# Patient Record
Sex: MALE | Race: WHITE | NOT HISPANIC OR LATINO | Employment: FULL TIME | URBAN - METROPOLITAN AREA
[De-identification: names, ages, dates, MRNs, and addresses within clinical notes are randomized per-mention and may not be internally consistent; named-entity substitution may affect disease eponyms.]

---

## 2018-02-21 ENCOUNTER — OFFICE VISIT CONVERTED (OUTPATIENT)
Dept: CARDIOLOGY | Facility: CLINIC | Age: 73
End: 2018-02-21
Attending: INTERNAL MEDICINE

## 2018-04-09 ENCOUNTER — OFFICE VISIT CONVERTED (OUTPATIENT)
Dept: FAMILY MEDICINE CLINIC | Facility: CLINIC | Age: 73
End: 2018-04-09
Attending: FAMILY MEDICINE

## 2018-08-09 ENCOUNTER — OFFICE VISIT CONVERTED (OUTPATIENT)
Dept: FAMILY MEDICINE CLINIC | Facility: CLINIC | Age: 73
End: 2018-08-09
Attending: NURSE PRACTITIONER

## 2018-08-29 ENCOUNTER — OFFICE VISIT CONVERTED (OUTPATIENT)
Dept: CARDIOLOGY | Facility: CLINIC | Age: 73
End: 2018-08-29
Attending: INTERNAL MEDICINE

## 2018-09-07 ENCOUNTER — OFFICE VISIT CONVERTED (OUTPATIENT)
Dept: FAMILY MEDICINE CLINIC | Facility: CLINIC | Age: 73
End: 2018-09-07
Attending: FAMILY MEDICINE

## 2019-02-08 ENCOUNTER — OFFICE VISIT CONVERTED (OUTPATIENT)
Dept: FAMILY MEDICINE CLINIC | Facility: CLINIC | Age: 74
End: 2019-02-08
Attending: FAMILY MEDICINE

## 2019-02-08 ENCOUNTER — CONVERSION ENCOUNTER (OUTPATIENT)
Dept: FAMILY MEDICINE CLINIC | Facility: CLINIC | Age: 74
End: 2019-02-08

## 2019-02-27 ENCOUNTER — HOSPITAL ENCOUNTER (OUTPATIENT)
Dept: LAB | Facility: HOSPITAL | Age: 74
Discharge: HOME | End: 2019-02-27
Attending: INTERNAL MEDICINE

## 2019-02-27 LAB
ALBUMIN SERPL-MCNC: 4 G/DL (ref 3.5–5)
ALBUMIN/GLOB SERPL: 1.3 {RATIO} (ref 1.4–2.6)
ALP SERPL-CCNC: 100 U/L (ref 56–155)
ALT SERPL-CCNC: 28 U/L (ref 10–40)
ANION GAP SERPL CALC-SCNC: 19 MMOL/L (ref 8–19)
AST SERPL-CCNC: 34 U/L (ref 15–50)
BASOPHILS # BLD AUTO: 0.02 10*3/UL (ref 0–0.2)
BASOPHILS NFR BLD AUTO: 0.3 % (ref 0–3)
BILIRUB SERPL-MCNC: 0.4 MG/DL (ref 0.2–1.3)
BUN SERPL-MCNC: 21 MG/DL (ref 5–25)
BUN/CREAT SERPL: 18 {RATIO} (ref 6–20)
CALCIUM SERPL-MCNC: 9.5 MG/DL (ref 8.7–10.4)
CHLORIDE SERPL-SCNC: 108 MMOL/L (ref 99–111)
CHOLEST SERPL-MCNC: 130 MG/DL (ref 107–200)
CHOLEST/HDLC SERPL: 2.4 {RATIO} (ref 3–6)
CONV ABS IMM GRAN: 0.01 10*3/UL (ref 0–0.2)
CONV CO2: 22 MMOL/L (ref 22–32)
CONV IMMATURE GRAN: 0.2 % (ref 0–1.8)
CONV TOTAL PROTEIN: 7 G/DL (ref 6.3–8.2)
CREAT UR-MCNC: 1.2 MG/DL (ref 0.7–1.2)
DEPRECATED RDW RBC AUTO: 47.3 FL (ref 35.1–43.9)
EOSINOPHIL # BLD AUTO: 0.17 10*3/UL (ref 0–0.7)
EOSINOPHIL # BLD AUTO: 2.7 % (ref 0–7)
ERYTHROCYTE [DISTWIDTH] IN BLOOD BY AUTOMATED COUNT: 13.6 % (ref 11.6–14.4)
GFR SERPLBLD BASED ON 1.73 SQ M-ARVRAT: >60 ML/MIN/{1.73_M2}
GLOBULIN UR ELPH-MCNC: 3 G/DL (ref 2–3.5)
GLUCOSE SERPL-MCNC: 99 MG/DL (ref 70–99)
HBA1C MFR BLD: 12.9 G/DL (ref 14–18)
HCT VFR BLD AUTO: 39.5 % (ref 42–52)
HDLC SERPL-MCNC: 54 MG/DL (ref 40–60)
LDLC SERPL CALC-MCNC: 62 MG/DL (ref 70–100)
LYMPHOCYTES # BLD AUTO: 1.63 10*3/UL (ref 1–5)
MCH RBC QN AUTO: 31 PG (ref 27–31)
MCHC RBC AUTO-ENTMCNC: 32.7 G/DL (ref 33–37)
MCV RBC AUTO: 95 FL (ref 80–96)
MONOCYTES # BLD AUTO: 0.62 10*3/UL (ref 0.2–1.2)
MONOCYTES NFR BLD AUTO: 10 % (ref 3–10)
NEUTROPHILS # BLD AUTO: 3.75 10*3/UL (ref 2–8)
NEUTROPHILS NFR BLD AUTO: 60.5 % (ref 30–85)
NRBC CBCN: 0 % (ref 0–0.7)
OSMOLALITY SERPL CALC.SUM OF ELEC: 303 MOSM/KG (ref 273–304)
PLATELET # BLD AUTO: 186 10*3/UL (ref 130–400)
PMV BLD AUTO: 10.9 FL (ref 9.4–12.4)
POTASSIUM SERPL-SCNC: 4.4 MMOL/L (ref 3.5–5.3)
RBC # BLD AUTO: 4.16 10*6/UL (ref 4.7–6.1)
SODIUM SERPL-SCNC: 145 MMOL/L (ref 135–147)
T4 FREE SERPL-MCNC: 1.5 NG/DL (ref 0.9–1.8)
TRIGL SERPL-MCNC: 72 MG/DL (ref 40–150)
TSH SERPL-ACNC: 1.37 M[IU]/L (ref 0.27–4.2)
VARIANT LYMPHS NFR BLD MANUAL: 26.3 % (ref 20–45)
VLDLC SERPL-MCNC: 14 MG/DL (ref 5–37)
WBC # BLD AUTO: 6.2 10*3/UL (ref 4.8–10.8)

## 2019-03-04 ENCOUNTER — OFFICE VISIT CONVERTED (OUTPATIENT)
Dept: CARDIOLOGY | Facility: CLINIC | Age: 74
End: 2019-03-04
Attending: INTERNAL MEDICINE

## 2019-08-13 ENCOUNTER — CONVERSION ENCOUNTER (OUTPATIENT)
Dept: FAMILY MEDICINE CLINIC | Facility: CLINIC | Age: 74
End: 2019-08-13

## 2019-08-13 ENCOUNTER — OFFICE VISIT CONVERTED (OUTPATIENT)
Dept: FAMILY MEDICINE CLINIC | Facility: CLINIC | Age: 74
End: 2019-08-13
Attending: FAMILY MEDICINE

## 2019-08-29 ENCOUNTER — HOSPITAL ENCOUNTER (OUTPATIENT)
Dept: LAB | Facility: HOSPITAL | Age: 74
Discharge: HOME | End: 2019-08-29
Attending: INTERNAL MEDICINE

## 2019-08-29 LAB
ALBUMIN SERPL-MCNC: 4.4 G/DL (ref 3.5–5)
ALBUMIN/GLOB SERPL: 1.6 {RATIO} (ref 1.4–2.6)
ALP SERPL-CCNC: 87 U/L (ref 56–155)
ALT SERPL-CCNC: 23 U/L (ref 10–40)
ANION GAP SERPL CALC-SCNC: 18 MMOL/L (ref 8–19)
AST SERPL-CCNC: 34 U/L (ref 15–50)
BASOPHILS # BLD AUTO: 0.01 10*3/UL (ref 0–0.2)
BASOPHILS NFR BLD AUTO: 0.2 % (ref 0–3)
BILIRUB SERPL-MCNC: 0.58 MG/DL (ref 0.2–1.3)
BUN SERPL-MCNC: 21 MG/DL (ref 5–25)
BUN/CREAT SERPL: 21 {RATIO} (ref 6–20)
CALCIUM SERPL-MCNC: 9.2 MG/DL (ref 8.7–10.4)
CHLORIDE SERPL-SCNC: 104 MMOL/L (ref 99–111)
CHOLEST SERPL-MCNC: 122 MG/DL (ref 107–200)
CHOLEST/HDLC SERPL: 2.5 {RATIO} (ref 3–6)
CONV ABS IMM GRAN: 0.01 10*3/UL (ref 0–0.2)
CONV CO2: 25 MMOL/L (ref 22–32)
CONV IMMATURE GRAN: 0.2 % (ref 0–1.8)
CONV TOTAL PROTEIN: 7.1 G/DL (ref 6.3–8.2)
CREAT UR-MCNC: 1.01 MG/DL (ref 0.7–1.2)
DEPRECATED RDW RBC AUTO: 48.2 FL (ref 35.1–43.9)
EOSINOPHIL # BLD AUTO: 0.1 10*3/UL (ref 0–0.7)
EOSINOPHIL # BLD AUTO: 2.1 % (ref 0–7)
ERYTHROCYTE [DISTWIDTH] IN BLOOD BY AUTOMATED COUNT: 13.8 % (ref 11.6–14.4)
GFR SERPLBLD BASED ON 1.73 SQ M-ARVRAT: >60 ML/MIN/{1.73_M2}
GLOBULIN UR ELPH-MCNC: 2.7 G/DL (ref 2–3.5)
GLUCOSE SERPL-MCNC: 87 MG/DL (ref 70–99)
HCT VFR BLD AUTO: 39.7 % (ref 42–52)
HDLC SERPL-MCNC: 49 MG/DL (ref 40–60)
HGB BLD-MCNC: 12.9 G/DL (ref 14–18)
LDLC SERPL CALC-MCNC: 60 MG/DL (ref 70–100)
LYMPHOCYTES # BLD AUTO: 1.52 10*3/UL (ref 1–5)
LYMPHOCYTES NFR BLD AUTO: 31.4 % (ref 20–45)
MCH RBC QN AUTO: 31 PG (ref 27–31)
MCHC RBC AUTO-ENTMCNC: 32.5 G/DL (ref 33–37)
MCV RBC AUTO: 95.4 FL (ref 80–96)
MONOCYTES # BLD AUTO: 0.44 10*3/UL (ref 0.2–1.2)
MONOCYTES NFR BLD AUTO: 9.1 % (ref 3–10)
NEUTROPHILS # BLD AUTO: 2.76 10*3/UL (ref 2–8)
NEUTROPHILS NFR BLD AUTO: 57 % (ref 30–85)
NRBC CBCN: 0 % (ref 0–0.7)
OSMOLALITY SERPL CALC.SUM OF ELEC: 298 MOSM/KG (ref 273–304)
PLATELET # BLD AUTO: 166 10*3/UL (ref 130–400)
PMV BLD AUTO: 11.2 FL (ref 9.4–12.4)
POTASSIUM SERPL-SCNC: 4.1 MMOL/L (ref 3.5–5.3)
RBC # BLD AUTO: 4.16 10*6/UL (ref 4.7–6.1)
SODIUM SERPL-SCNC: 143 MMOL/L (ref 135–147)
T4 FREE SERPL-MCNC: 1.5 NG/DL (ref 0.9–1.8)
TRIGL SERPL-MCNC: 63 MG/DL (ref 40–150)
TSH SERPL-ACNC: 1.32 M[IU]/L (ref 0.27–4.2)
VLDLC SERPL-MCNC: 13 MG/DL (ref 5–37)
WBC # BLD AUTO: 4.84 10*3/UL (ref 4.8–10.8)

## 2019-09-04 ENCOUNTER — OFFICE VISIT CONVERTED (OUTPATIENT)
Dept: CARDIOLOGY | Facility: CLINIC | Age: 74
End: 2019-09-04
Attending: INTERNAL MEDICINE

## 2019-11-07 ENCOUNTER — HOSPITAL ENCOUNTER (OUTPATIENT)
Dept: URGENT CARE | Facility: CLINIC | Age: 74
Discharge: HOME | End: 2019-11-07
Attending: EMERGENCY MEDICINE

## 2020-03-09 ENCOUNTER — HOSPITAL ENCOUNTER (OUTPATIENT)
Dept: LAB | Facility: HOSPITAL | Age: 75
Discharge: HOME | End: 2020-03-09
Attending: NURSE PRACTITIONER

## 2020-03-09 LAB
ALBUMIN SERPL-MCNC: 4.1 G/DL (ref 3.5–5)
ALBUMIN/GLOB SERPL: 1.4 {RATIO} (ref 1.4–2.6)
ALP SERPL-CCNC: 84 U/L (ref 56–155)
ALT SERPL-CCNC: 23 U/L (ref 10–40)
ANION GAP SERPL CALC-SCNC: 16 MMOL/L (ref 8–19)
AST SERPL-CCNC: 34 U/L (ref 15–50)
BASOPHILS # BLD AUTO: 0.03 10*3/UL (ref 0–0.2)
BASOPHILS NFR BLD AUTO: 0.4 % (ref 0–3)
BILIRUB SERPL-MCNC: 0.43 MG/DL (ref 0.2–1.3)
BUN SERPL-MCNC: 22 MG/DL (ref 5–25)
BUN/CREAT SERPL: 20 {RATIO} (ref 6–20)
CALCIUM SERPL-MCNC: 9.4 MG/DL (ref 8.7–10.4)
CHLORIDE SERPL-SCNC: 103 MMOL/L (ref 99–111)
CHOLEST SERPL-MCNC: 136 MG/DL (ref 107–200)
CHOLEST/HDLC SERPL: 2.5 {RATIO} (ref 3–6)
CONV ABS IMM GRAN: 0.02 10*3/UL (ref 0–0.2)
CONV CO2: 24 MMOL/L (ref 22–32)
CONV IMMATURE GRAN: 0.3 % (ref 0–1.8)
CONV TOTAL PROTEIN: 7 G/DL (ref 6.3–8.2)
CREAT UR-MCNC: 1.09 MG/DL (ref 0.7–1.2)
DEPRECATED RDW RBC AUTO: 46.9 FL (ref 35.1–43.9)
EOSINOPHIL # BLD AUTO: 0.18 10*3/UL (ref 0–0.7)
EOSINOPHIL # BLD AUTO: 2.5 % (ref 0–7)
ERYTHROCYTE [DISTWIDTH] IN BLOOD BY AUTOMATED COUNT: 13.4 % (ref 11.6–14.4)
GFR SERPLBLD BASED ON 1.73 SQ M-ARVRAT: >60 ML/MIN/{1.73_M2}
GLOBULIN UR ELPH-MCNC: 2.9 G/DL (ref 2–3.5)
GLUCOSE SERPL-MCNC: 93 MG/DL (ref 70–99)
HCT VFR BLD AUTO: 40.6 % (ref 42–52)
HDLC SERPL-MCNC: 54 MG/DL (ref 40–60)
HGB BLD-MCNC: 13.3 G/DL (ref 14–18)
LDLC SERPL CALC-MCNC: 59 MG/DL (ref 70–100)
LYMPHOCYTES # BLD AUTO: 1.87 10*3/UL (ref 1–5)
LYMPHOCYTES NFR BLD AUTO: 25.5 % (ref 20–45)
MCH RBC QN AUTO: 31.4 PG (ref 27–31)
MCHC RBC AUTO-ENTMCNC: 32.8 G/DL (ref 33–37)
MCV RBC AUTO: 95.8 FL (ref 80–96)
MONOCYTES # BLD AUTO: 0.66 10*3/UL (ref 0.2–1.2)
MONOCYTES NFR BLD AUTO: 9 % (ref 3–10)
NEUTROPHILS # BLD AUTO: 4.57 10*3/UL (ref 2–8)
NEUTROPHILS NFR BLD AUTO: 62.3 % (ref 30–85)
NRBC CBCN: 0 % (ref 0–0.7)
OSMOLALITY SERPL CALC.SUM OF ELEC: 291 MOSM/KG (ref 273–304)
PLATELET # BLD AUTO: 158 10*3/UL (ref 130–400)
PMV BLD AUTO: 11.1 FL (ref 9.4–12.4)
POTASSIUM SERPL-SCNC: 4.2 MMOL/L (ref 3.5–5.3)
RBC # BLD AUTO: 4.24 10*6/UL (ref 4.7–6.1)
SODIUM SERPL-SCNC: 139 MMOL/L (ref 135–147)
T4 FREE SERPL-MCNC: 1.3 NG/DL (ref 0.9–1.8)
TRIGL SERPL-MCNC: 113 MG/DL (ref 40–150)
TSH SERPL-ACNC: 2.68 M[IU]/L (ref 0.27–4.2)
VLDLC SERPL-MCNC: 23 MG/DL (ref 5–37)
WBC # BLD AUTO: 7.33 10*3/UL (ref 4.8–10.8)

## 2020-04-02 ENCOUNTER — OFFICE VISIT CONVERTED (OUTPATIENT)
Dept: UROLOGY | Facility: CLINIC | Age: 75
End: 2020-04-02
Attending: UROLOGY

## 2020-04-02 ENCOUNTER — CONVERSION ENCOUNTER (OUTPATIENT)
Dept: SURGERY | Facility: CLINIC | Age: 75
End: 2020-04-02

## 2020-04-28 ENCOUNTER — HOSPITAL ENCOUNTER (OUTPATIENT)
Dept: PERIOP | Facility: HOSPITAL | Age: 75
Setting detail: HOSPITAL OUTPATIENT SURGERY
Discharge: HOME | End: 2020-04-28
Attending: UROLOGY

## 2020-05-07 ENCOUNTER — CONVERSION ENCOUNTER (OUTPATIENT)
Dept: SURGERY | Facility: CLINIC | Age: 75
End: 2020-05-07

## 2020-05-07 ENCOUNTER — TELEMEDICINE CONVERTED (OUTPATIENT)
Dept: UROLOGY | Facility: CLINIC | Age: 75
End: 2020-05-07
Attending: UROLOGY

## 2020-05-21 ENCOUNTER — HOSPITAL ENCOUNTER (OUTPATIENT)
Dept: OTHER | Facility: HOSPITAL | Age: 75
Discharge: HOME | End: 2020-05-21
Attending: INTERNAL MEDICINE

## 2020-05-21 ENCOUNTER — TELEMEDICINE CONVERTED (OUTPATIENT)
Dept: CARDIOLOGY | Facility: CLINIC | Age: 75
End: 2020-05-21
Attending: INTERNAL MEDICINE

## 2020-05-21 LAB
ALBUMIN SERPL-MCNC: 4.2 G/DL (ref 3.5–5)
ALBUMIN/GLOB SERPL: 1.8 {RATIO} (ref 1.4–2.6)
ALP SERPL-CCNC: 68 U/L (ref 56–155)
ALT SERPL-CCNC: 19 U/L (ref 10–40)
ANION GAP SERPL CALC-SCNC: 11 MMOL/L (ref 8–19)
AST SERPL-CCNC: 30 U/L (ref 15–50)
BASOPHILS # BLD AUTO: 0.02 10*3/UL (ref 0–0.2)
BASOPHILS NFR BLD AUTO: 0.3 % (ref 0–3)
BILIRUB SERPL-MCNC: 0.27 MG/DL (ref 0.2–1.3)
BUN SERPL-MCNC: 10 MG/DL (ref 5–25)
BUN/CREAT SERPL: 11 {RATIO} (ref 6–20)
CALCIUM SERPL-MCNC: 9 MG/DL (ref 8.7–10.4)
CHLORIDE SERPL-SCNC: 107 MMOL/L (ref 99–111)
CONV ABS IMM GRAN: 0.02 10*3/UL (ref 0–0.2)
CONV CO2: 26 MMOL/L (ref 22–32)
CONV IMMATURE GRAN: 0.3 % (ref 0–1.8)
CONV TOTAL PROTEIN: 6.5 G/DL (ref 6.3–8.2)
CREAT UR-MCNC: 0.89 MG/DL (ref 0.7–1.2)
DEPRECATED RDW RBC AUTO: 47.8 FL (ref 35.1–43.9)
EOSINOPHIL # BLD AUTO: 0.22 10*3/UL (ref 0–0.7)
EOSINOPHIL # BLD AUTO: 3.8 % (ref 0–7)
ERYTHROCYTE [DISTWIDTH] IN BLOOD BY AUTOMATED COUNT: 13.5 % (ref 11.6–14.4)
GFR SERPLBLD BASED ON 1.73 SQ M-ARVRAT: >60 ML/MIN/{1.73_M2}
GLOBULIN UR ELPH-MCNC: 2.3 G/DL (ref 2–3.5)
GLUCOSE SERPL-MCNC: 102 MG/DL (ref 70–99)
HCT VFR BLD AUTO: 35.4 % (ref 42–52)
HGB BLD-MCNC: 11.3 G/DL (ref 14–18)
LYMPHOCYTES # BLD AUTO: 1.7 10*3/UL (ref 1–5)
LYMPHOCYTES NFR BLD AUTO: 29.2 % (ref 20–45)
MCH RBC QN AUTO: 30.5 PG (ref 27–31)
MCHC RBC AUTO-ENTMCNC: 31.9 G/DL (ref 33–37)
MCV RBC AUTO: 95.4 FL (ref 80–96)
MONOCYTES # BLD AUTO: 0.52 10*3/UL (ref 0.2–1.2)
MONOCYTES NFR BLD AUTO: 8.9 % (ref 3–10)
NEUTROPHILS # BLD AUTO: 3.35 10*3/UL (ref 2–8)
NEUTROPHILS NFR BLD AUTO: 57.5 % (ref 30–85)
NRBC CBCN: 0 % (ref 0–0.7)
OSMOLALITY SERPL CALC.SUM OF ELEC: 289 MOSM/KG (ref 273–304)
PLATELET # BLD AUTO: 184 10*3/UL (ref 130–400)
PMV BLD AUTO: 10.3 FL (ref 9.4–12.4)
POTASSIUM SERPL-SCNC: 4.1 MMOL/L (ref 3.5–5.3)
RBC # BLD AUTO: 3.71 10*6/UL (ref 4.7–6.1)
SODIUM SERPL-SCNC: 140 MMOL/L (ref 135–147)
WBC # BLD AUTO: 5.83 10*3/UL (ref 4.8–10.8)

## 2020-11-04 ENCOUNTER — CONVERSION ENCOUNTER (OUTPATIENT)
Dept: FAMILY MEDICINE CLINIC | Facility: CLINIC | Age: 75
End: 2020-11-04

## 2020-11-04 ENCOUNTER — OFFICE VISIT CONVERTED (OUTPATIENT)
Dept: FAMILY MEDICINE CLINIC | Facility: CLINIC | Age: 75
End: 2020-11-04
Attending: INTERNAL MEDICINE

## 2020-12-29 ENCOUNTER — HOSPITAL ENCOUNTER (OUTPATIENT)
Dept: LAB | Facility: HOSPITAL | Age: 75
Discharge: HOME | End: 2020-12-29
Attending: INTERNAL MEDICINE

## 2020-12-29 LAB
ALBUMIN SERPL-MCNC: 3.9 G/DL (ref 3.5–5)
ALBUMIN/GLOB SERPL: 1.4 {RATIO} (ref 1.4–2.6)
ALP SERPL-CCNC: 91 U/L (ref 56–155)
ALT SERPL-CCNC: 24 U/L (ref 10–40)
ANION GAP SERPL CALC-SCNC: 14 MMOL/L (ref 8–19)
AST SERPL-CCNC: 33 U/L (ref 15–50)
BILIRUB SERPL-MCNC: 0.29 MG/DL (ref 0.2–1.3)
BUN SERPL-MCNC: 18 MG/DL (ref 5–25)
BUN/CREAT SERPL: 15 {RATIO} (ref 6–20)
CALCIUM SERPL-MCNC: 9.1 MG/DL (ref 8.7–10.4)
CHLORIDE SERPL-SCNC: 105 MMOL/L (ref 99–111)
CHOLEST SERPL-MCNC: 141 MG/DL (ref 107–200)
CHOLEST/HDLC SERPL: 2.4 {RATIO} (ref 3–6)
CONV CO2: 25 MMOL/L (ref 22–32)
CONV TOTAL PROTEIN: 6.7 G/DL (ref 6.3–8.2)
CREAT UR-MCNC: 1.24 MG/DL (ref 0.7–1.2)
GFR SERPLBLD BASED ON 1.73 SQ M-ARVRAT: 57 ML/MIN/{1.73_M2}
GLOBULIN UR ELPH-MCNC: 2.8 G/DL (ref 2–3.5)
GLUCOSE SERPL-MCNC: 98 MG/DL (ref 70–99)
HDLC SERPL-MCNC: 58 MG/DL (ref 40–60)
LDLC SERPL CALC-MCNC: 61 MG/DL (ref 70–100)
OSMOLALITY SERPL CALC.SUM OF ELEC: 292 MOSM/KG (ref 273–304)
POTASSIUM SERPL-SCNC: 4.3 MMOL/L (ref 3.5–5.3)
SODIUM SERPL-SCNC: 140 MMOL/L (ref 135–147)
TRIGL SERPL-MCNC: 108 MG/DL (ref 40–150)
VLDLC SERPL-MCNC: 22 MG/DL (ref 5–37)

## 2021-01-04 ENCOUNTER — OFFICE VISIT CONVERTED (OUTPATIENT)
Dept: CARDIOLOGY | Facility: CLINIC | Age: 76
End: 2021-01-04
Attending: INTERNAL MEDICINE

## 2021-05-10 NOTE — PROCEDURES
Procedure Note      Patient Name: Derek Castelan   Patient ID: [id number]   Sex: Male   Birthdate: Wilma 15, 1945    Primary Care Provider: Huey Hadley MD   Referring Provider: Huey Hadley MD    Visit Date: April 2, 2020    Provider: Laurel Orellana MD   Location: [facility]   Location Address: [address] [place] [address]  [id number]   Location Phone: [phone]          Cystoscopy Procedure:  PROCEDURE: Flexible cystoscope was passed per urethra into the bladder without difficulty after proper consent. The bladder was inspected in a systematic meridian fashion. There was several large papillary tumors at the base of bladder and overlying the trigone; obscuring somewhat the ureteral orifices. Papillary carpeting noted as well. Some edematous and erythematous bladder mucosa at the posterior wall consistent with ventura catheter placement. No stones, or other abnormalities noted within the bladder. The flexible cystoscope was removed. The catheter remained out at the conclusion of the procedure. The patient tolerated the procedure well.           Assessment  · Hematuria     599.70/R31.9  · Bladder mass     596.89/N32.89    Problems Reconciled  Plan  · Orders  o Cystoscopy (19334) - - 04/02/2020  · Medications  o Medications have been Reconciled  o Transition of Care or Provider Policy  · Instructions  o Electronically Identified Patient Education Materials Provided Electronically     Tolerated procedure well  Catheter remained out; wife provided supplies should patient not be able to void later  Findings discussed with patient and wife at length; findings consistent with bladder cancer but aware that dx remains uncertain until pathologic dx obtained  Given the extent of the tumor and unknown pathology, recommend proceeding with Cystoscopy, bilateral retrograde pyelogram, and transurethral resection of bladder tumor in OR   Recommend that this be performed as soon as possible; risks/benefit  discussion performed given Covid-19 pandemic; patient aware that depending or operative findings may need overnight observation and will likely go home with catheter while bladder heals  Risks of procedure discussed include but not limited to bleeding, infection, pain, damage to surrounding structure, and need for further procedures.  Discussed that cardiac clearance will be required and if at all possible, will need to hold or bridge anticoagulation; will obtain from Cards; patient to arrange f/u appt with Dr. Chao as had previously been postponed due to pandemic    Patient and wife participated in the discussion and are agreeable to proceed to OR once cardiac clearance obtained  Will schedule  All questions addressed             Electronically Signed by: Laurel Orellana MD -Author on April 8, 2020 02:18:47 PM

## 2021-05-10 NOTE — H&P
"   History and Physical      Patient Name: Derek Castelan   Patient ID: [id number]   Sex: Male   Birthdate: Wilma 15, 1945    Primary Care Provider: Huey Hadley MD   Referring Provider: Huey Hadley MD    Visit Date: April 2, 2020    Provider: Laurel Orellana MD   Location: [facility]   Location Address: [address] [place] [address]  [id number]   Location Phone: [phone]          Chief Complaint  · Pt is here for urological concerns      History Of Present Illness  The patient is a 74 year old /White male, who is a consultation from Huey Hadley MD, for the evaluation of an episode of gross hematuria. He noticed blood in his urine 2 weeks ago. The hematuria was constant throughout the stream. It was associated with strenuous activity and mowing and heavy lifting.     He states the color of his urine during an episode is bright red and with large clots. The patient has no additional complaints. He denies urgency, dysuria, back pain, fever, chills, nausea, vomiting, and Complains of increased recent nocturia for which he took myrbetriq which helped little..     He states that there is no history of recent abdominal or flank trauma. The patient's past medical history is notable for anticoagulant use and Brillinta by Dr. Chao. Has never come off of it for any procedures. H/o 6 vessel CABG..     The patient has not been previously evaluated for hematuria.      600cc. Patient currently has catheter in, hematuria resolved spontaneously.     CT 3/27/20 with bilateral hydronephrosis and 5 cm hyperdensity within lumen of bladder clot vs tumor  Urine ctx 3/27/20: no growth\">Denies recent weight loss.  No family h/o  malignancy.  No prior tobacco use.    Wife is retired nurse; placed catheter in patient prior to presenting to ER when hematuria started due to difficulty voiding; yielded >600cc. Patient currently has catheter in, hematuria resolved spontaneously.     CT 3/27/20 with bilateral " "hydronephrosis and 5 cm hyperdensity within lumen of bladder clot vs tumor  Urine ctx 3/27/20: no growth       Past Medical History  Calculus Of Kidney; Coronary artery disease; High cholesterol; Hypertension; Hypothyroidism         Past Surgical History  Hernia; Open Heart Surgery; skin cancer removed         Medication List  aspirin 81 mg Oral tablet,delayed release (DR/EC); Brilinta 60 mg oral tablet; carvedilol 3.125 mg oral tablet; Centrum Silver 0.4-300-250 mg-mcg-mcg oral tablet; Crestor 40 mg oral tablet; levothyroxine 88 mcg oral tablet; Myrbetriq 50 mg oral tablet extended release 24 hr; nitroglycerin 0.2 mg/hr transdermal patch 24 hour         Allergy List  NO KNOWN DRUG ALLERGIES         Family Medical History  Heart Disease; Diabetes, unspecified type; Hypertension         Social History  Active but no formal exercise; Alcohol (Never); ; No known infection risk; Tobacco (Never)         Immunizations  Name Date Admin   Influenza    Influenza    Pneumococcal    Tdap          Review of Systems  · Constitutional  o Denies  o : chills, fever  · Cardiovascular  o Denies  o : chest pain on exertion  · Gastrointestinal  o Denies  o : nausea, vomiting, diarrhea  · Genitourinary  o Admits  o : blood in urine  · Neurologic  o Denies  o : tingling or numbness  · Musculoskeletal  o Denies  o : joint pain  · Endocrine  o Denies  o : weight gain, weight loss  · Heme-Lymph  o Admits  o : easy bleeding, easy bruising      Vitals  Date Time BP Position Site L\R Cuff Size HR RR TEMP (F) WT  HT  BMI kg/m2 BSA m2 O2 Sat        04/02/2020 11:11 AM       14  197lbs 0oz 5'  10\" 28.27 2.1           Physical Examination  · Constitutional  o Appearance  o : Well nourished, well developed patient in no acute distress. Ambulating without difficulty.  · Head and Face  o Head  o :   § Inspection  § : Normocephalic, atraumatic  o Face  o :   § Inspection  § : No facial lesions  · Respiratory  o Respiratory Effort  o : " Breathing is unlabored without accessory muscle use  o Inspection of Chest  o : Normal appearance, no retractions  · Cardiovascular  o Heart  o : normal rate   · Skin and Subcutaneous Tissue  o General Inspection  o : No rashes, lesions or areas of discoloration present. Skin turgor is normal.  o General Palpation  o : No abnormalities, masses or tenderness on palpation.  · Neurologic  o Mental Status Examination  o :   § Orientation  § : alert and oriented x 3  o Gait and Station  o :   § Gait Screening  § : Ambulating wiithout difficulty  · Psychiatric  o Mood and Affect  o : mood normal, affect appropriate          Results     60ml/min/1.73m2     FINDINGS: Mild to moderate hydronephrosis and hydroureter are identified bilaterally.  No   obstructing ureteral calculi are seen.  Hyperdensity is present in the posterior urinary bladder   lumen, measuring at least 5.8 x 5 x 4.3 cm in craniocaudal, AP (anteroposterior), and transverse   extent, respectively, as seen on image 105 of series 603 and image 92 of series 2 and adjacent   images.  The urinary bladder is distended.  An air-fluid interface is identified in the urinary   bladder.  A malignant process is suspected, such as with a uroepithelial carcinoma with associated   bilateral obstructive uropathy.  Consider Urology consultation for further assessment, management,   and treatment if not already obtained.  There is a small left lateral urinary bladder diverticulum,   measuring about 1.5 cm in AP diameter, as seen on image 90 of series 5 and adjacent images.  No   urinary bladder calculi are seen.  No nonobstructing nephrolithiasis is suggested.  Probably no   enlarged intrapelvic or intra-abdominal lymph nodes are appreciated.  No definite filling defects   are seen in the pelvicaliceal systems on delayed imaging.  There are pelvic phleboliths.  There is   mild diffuse prostatomegaly with central prostatic calcifications.  No acute colitis or   diverticulitis.  " No acute appendicitis.  No mechanical bowel obstruction.  No pneumoperitoneum.  No   acute inflammatory change bowel.  No acute cholecystitis or pancreatitis is suggested by CT   examination.  Atherosclerotic change is present without aneurysmal enlargement of the aortoiliac   arterial system.  There are degenerative changes throughout the imaged spine.  No aggressive   osseous lesions are suggested.  No acute infiltrate is seen in the imaged lung bases.  There is   chronic calcified granulomatous disease of the chest and spleen.  No hepatosplenomegaly is seen.    There are bilateral renal cysts, which are probably benign; they measure about 1 2 cm in greatest   diameter.  Degenerative changes also involve the sacroiliac joints and the bilateral hip joints, as   well.     CONCLUSION:      1. Hyperdensity is seen in lumen of the urinary bladder with thickening of its posterior wall.  The   hyperdensity may represent hemorrhage.  Tumor is also possible.  The findings are suggestive of   malignant process, such as uroepithelial carcinoma.  There is bilateral mild to moderate   hydronephrosis and hydroureter, suggesting obstructive uropathy at the bilateral ureterovesical   junctions (UVJs).  No obstructing ureteral calculi are seen.  The urinary bladder is distended.  A   urinary bladder outlet obstruction cannot be excluded.  An air-fluid interface involves the urinary   bladder and is probably related to recent catheterization.       2. Otherwise, no acute findings are seen.             MANASA SHELBY JR, MD         Electronically Signed and Approved By: MANASA SHELBY JR, MD on 3/27/2020 at 21:35            Until signed, this is an unconfirmed preliminary report that may contain  errors and is subject to change.                STAS:  D:03/27/20 2135  \">[facility]      PACS RADIOLOGY REPORT    Patient: DESIRE ROD Acct: #[id number] Report: #[id number]    UNIT " #: [id number]  DOS: 20  INSURANCE:BLUE CROSS - KEHP ORDER #:[id number]  LOCATION:ER   : 1945    PROVIDERS  ADMITTING:   ATTENDING:   FAMILY:  MD ARMANDO ORDERING:  Jann Rutledge   OTHER:  DICTATING:  Jose Valencia MD, JR    REQ #:[id number]   EXAM:[id number] - CT ABDOMEN PELVIS wwo CONTRAST  REASON FOR EXAM:  Hematuria  REASON FOR VISIT:  POSS LOSING BLOOD    *******Signed******     PROCEDURE: CT ABDOMEN PELVIS WITHOUT AND WITH CONTRAST     COMPARISON: None.     INDICATIONS: SEVERE HEMATURIA, DYSURIA, DIARRHEA.     TECHNIQUE: After obtaining the patient's consent, 889 CT images of the abdomen were created without   and with non-ionic intravenous contrast material, and CT images of the pelvis were obtained with   non-ionic intravenous contrast material.       PROTOCOL:   Urology imaging protocol performed      RADIATION:   DLP: 2561mGy*cm    Automated exposure control was utilized to minimize radiation dose.   CONTRAST: 100cc Isovue 370 I.V.  LABS:   eGFR: >60ml/min/1.73m2     FINDINGS: Mild to moderate hydronephrosis and hydroureter are identified bilaterally.  No   obstructing ureteral calculi are seen.  Hyperdensity is present in the posterior urinary bladder   lumen, measuring at least 5.8 x 5 x 4.3 cm in craniocaudal, AP (anteroposterior), and transverse   extent, respectively, as seen on image 105 of series 603 and image 92 of series 2 and adjacent   images.  The urinary bladder is distended.  An air-fluid interface is identified in the urinary   bladder.  A malignant process is suspected, such as with a uroepithelial carcinoma with associated   bilateral obstructive uropathy.  Consider Urology consultation for further assessment, management,   and treatment if not already obtained.  There is a small left lateral urinary bladder diverticulum,   measuring about 1.5 cm in AP diameter, as seen on image 90 of series 5 and adjacent images.  No   urinary bladder calculi are seen.  No nonobstructing  nephrolithiasis is suggested.  Probably no   enlarged intrapelvic or intra-abdominal lymph nodes are appreciated.  No definite filling defects   are seen in the pelvicaliceal systems on delayed imaging.  There are pelvic phleboliths.  There is   mild diffuse prostatomegaly with central prostatic calcifications.  No acute colitis or   diverticulitis.  No acute appendicitis.  No mechanical bowel obstruction.  No pneumoperitoneum.  No   acute inflammatory change bowel.  No acute cholecystitis or pancreatitis is suggested by CT   examination.  Atherosclerotic change is present without aneurysmal enlargement of the aortoiliac   arterial system.  There are degenerative changes throughout the imaged spine.  No aggressive   osseous lesions are suggested.  No acute infiltrate is seen in the imaged lung bases.  There is   chronic calcified granulomatous disease of the chest and spleen.  No hepatosplenomegaly is seen.    There are bilateral renal cysts, which are probably benign; they measure about 1 2 cm in greatest   diameter.  Degenerative changes also involve the sacroiliac joints and the bilateral hip joints, as   well.     CONCLUSION:      1. Hyperdensity is seen in lumen of the urinary bladder with thickening of its posterior wall.  The   hyperdensity may represent hemorrhage.  Tumor is also possible.  The findings are suggestive of   malignant process, such as uroepithelial carcinoma.  There is bilateral mild to moderate   hydronephrosis and hydroureter, suggesting obstructive uropathy at the bilateral ureterovesical   junctions (UVJs).  No obstructing ureteral calculi are seen.  The urinary bladder is distended.  A   urinary bladder outlet obstruction cannot be excluded.  An air-fluid interface involves the urinary   bladder and is probably related to recent catheterization.       2. Otherwise, no acute findings are seen.             MANASA SHELBY JR, MD         Electronically Signed and Approved By: MANASA SHELBY  JR VALDEZ on 3/27/2020 at 21:35            Until signed, this is an unconfirmed preliminary report that may contain  errors and is subject to change.                STAS:  D:03/27/20 2135         Assessment  · Gross hematuria     599.71/R31.0    Problems Reconciled  Plan  · Medications  o Medications have been Reconciled  o Transition of Care or Provider Policy  · Instructions  o DISCUSSION:  o The patient has documented hematuria. I have discussed the etiologies of hematuria and the options for management and treatment. I have outlined my recommendation for this problem. The patient is in agreement with the plans. The patient is aware that if the workup is not completed, there is a chance that a malignancy may go undetected and may lead to morbidity or mortality.  o PLAN:  o Will do cystoscopy today  o Electronically Identified Patient Education Materials Provided Electronically     ER records, labs, and imaging reviewed.  Please refer to subsequent cystoscopy note for further plans.  All questions addressed    Total time for encounter greater than 30 minutes due to review of records, counseling and coordination of care which dominated greater than 51% of the encounter.             Electronically Signed by: Laurel Orellana MD -Author on April 8, 2020 02:01:05 PM

## 2021-05-13 NOTE — PROGRESS NOTES
"   Progress Note      Patient Name: Derek Castelan   Patient ID: [id number]   Sex: Male   Birthdate: Wilma 15, 1945    Primary Care Provider: Huey Hadley MD   Referring Provider: Laurel Orellana MD    Visit Date: May 7, 2020    Provider: Laurel Orellana MD   Location: [facility]   Location Address: [address] [place] [address]  [id number]   Location Phone: [phone]          Chief Complaint  · Pt here today for urological concerns      History Of Present Illness  Video Conferencing Visit  Derek Castelan is a 74 year old /White male who is presenting for evaluation via video conferencing. Verbal consent obtained before beginning visit.   The following staff were present during this visit: Quynh Hussein LPN      600cc. Patient currently has catheter in, hematuria resolved spontaneously.     CT 3/27/20 with bilateral hydronephrosis and 5 cm hyperdensity within lumen of bladder clot vs tumor  Urine ctx 3/27/20: no growth    Update 5/7/20: Presents for post op follow up from TURBT, bilateral retrograde pyelogram. States he is doing well and did well post op; hematuria resolved quickly. Catheter removed yesterday by wife. Voiding spontaneously without complaint. Out mowing today.  Has resumed brillinta.    Bladder path 4/28/20: HGT1 urothelial carcinoma\">Initially seen for evaluation of hematuria, consultation from Huey Hadley MD. Hnoticed blood in his urine 2 weeks ago. The hematuria was constant throughout the stream. It was associated with strenuous activity and mowing and heavy lifting.     He states the color of his urine during an episode is bright red and with large clots. The patient has no additional complaints. He denies urgency, dysuria, back pain, fever, chills, nausea, vomiting, and Complains of increased recent nocturia for which he took myrbetriq which helped little..     He states that there is no history of recent abdominal or flank trauma. The patient's past medical history is " notable for anticoagulant use and Brillinta by Dr. Chao. Has never come off of it for any procedures. H/o 6 vessel CABG..     The patient has not been previously evaluated for hematuria.   Denies recent weight loss.  No family h/o  malignancy.  No prior tobacco use.    Wife is retired nurse; placed catheter in patient prior to presenting to ER when hematuria started due to difficulty voiding; yielded >600cc. Patient currently has catheter in, hematuria resolved spontaneously.     CT 3/27/20 with bilateral hydronephrosis and 5 cm hyperdensity within lumen of bladder clot vs tumor  Urine ctx 3/27/20: no growth    Update 5/7/20: Presents for post op follow up from TURBT, bilateral retrograde pyelogram. States he is doing well and did well post op; hematuria resolved quickly. Catheter removed yesterday by wife. Voiding spontaneously without complaint. Out mowing today.  Has resumed brillinta.    Bladder path 4/28/20: HGT1 urothelial carcinoma       Past Medical History  Bladder mass; Calculus Of Kidney; Coronary artery disease; Hematuria; High cholesterol; Hypertension; Hypothyroidism         Past Surgical History  Hernia; Open Heart Surgery; skin cancer removed         Medication List  aspirin 81 mg Oral tablet,delayed release (DR/EC); Brilinta 60 mg oral tablet; carvedilol 3.125 mg oral tablet; Centrum Silver 0.4-300-250 mg-mcg-mcg oral tablet; Crestor 40 mg oral tablet; levothyroxine 88 mcg oral tablet; nitroglycerin 0.2 mg/hr transdermal patch 24 hour; Stool Softener oral         Allergy List  NO KNOWN DRUG ALLERGIES       Allergies Reconciled  Family Medical History  Heart Disease; Diabetes, unspecified type; Hypertension         Social History  Active but no formal exercise; Alcohol (Never); ; No known infection risk; Tobacco (Never)         Review of Systems  · Constitutional  o Denies  o : chills, fever  · Gastrointestinal  o Denies  o : nausea, vomiting      Vitals  Date Time BP Position Site L\R Cuff  "Size HR RR TEMP (F) WT  HT  BMI kg/m2 BSA m2 O2 Sat HC       05/07/2020 04:15 PM         184lbs 16oz 5'  10\" 26.54 2.04           Physical Examination  · Constitutional  o Appearance  o : Well nourished, well developed patient in no acute distress. Ambulating without difficulty.  · Head and Face  o Head  o :   § Inspection  § : Normocephalic, atraumatic  o Face  o :   § Inspection  § : No facial lesions  · Respiratory  o Respiratory Effort  o : Breathing is unlabored  · Neurologic  o Mental Status Examination  o :   § Orientation  § : alert and oriented x 3  · Psychiatric  o Mood and Affect  o : mood normal, affect appropriate              Assessment  · Gross hematuria     599.71/R31.0  · Bladder cancer     188.9/C67.9    Problems Reconciled  Plan  · Medications  o Medications have been Reconciled  o Transition of Care or Provider Policy  · Instructions  o Electronically Identified Patient Education Materials Provided Electronically     HGT1 bladder cancer-  Operative findings discussed again- he had some abnormal appearing mucosa which was completely resected but also enlarged prostate with median lobe, diffuse bladder erythema, inflammation, and cystitis cystica making selection of resection areas somewhat challenging. The hydronephrosis previously appreciated on CT had resolved and is believed to be due to the acute urinary retention he experienced from clot.    The patient and his wife were counseled regarding diagnostic results, prognosis and risks and benefits of treatment options.         High Grade TI urothelial cancer. I have discussed the management of high grade T1 urothelial cancer of the bladder with the patient, including a 25-40% likelihood of detecting T2 or greater disease at re-resection. I have discussed the risks and prognosis of High grade TI disease with the patient including progression to MIBC and recurrence of HGTI. I have discussed bladder preserving management with BCG and intravesical " agents, and the risks of infection, bladder screening, recurrence and progression, as well as the need for maintenance with BCG. I have discussed the role of early cystectomy for patients with HGTI and the excellent overall survival for patient with HGT1 bladder cancer who have a cystectomy. I mentioned that even with HGT1 there is a 10-15% chance of being discovered to have salome metastasis.      Recent pathology was HGT1 and discussed with patient that guidelines dictate re-resection in about 6 weeks, ensuring adequate healing prior- aware that Brillinta will have to be held again.  Will plan for repeat bilateral retrogrades at that time given history of bilateral hydronephrosis at time of hematuria.   Given the above limitations visually at time of resection as above and per operative note, will discuss with pathology if frozen sections at time of re-resection is possible if needed    Schedule OR, TURBT, bilateral retrograde pyelogram 6 weeks  Hold brillinta 3 days prior  Nonmuscle invasive handouts to be mailed to patient      Total time for encounter greater than 45 minutes due to review of records, counseling and coordination of care which dominated greater than 51% of the encounter.             Electronically Signed by: Laurel Orellana MD -Author on May 9, 2020 04:18:04 PM

## 2021-05-13 NOTE — PROGRESS NOTES
Progress Note      Patient Name: Derek Castelan   Patient ID: [id number]   Sex: Male   Birthdate: Wilma 15, 1945    Primary Care Provider: Manuel Moore DO    Visit Date: November 4, 2020    Provider: Manuel Moore DO   Location: [facility]   Location Address: [address] [place] [address], [address]  [place], [place]  [id number]   Location Phone: [phone]          Chief Complaint  · Welcome to Medicare Visit      History Of Present Illness  The patient is a 75 year old /White male who has come to this office for his Welcome to Medicare Visit. His Primary Care Provider is Manuel oMore DO. His comprehensive Care Team list, including suppliers, has been updated on the Facesheet. His medical/surgical/family history, height, weight, BMI, and blood pressure have been reviewed and are in the chart.   Medications are listed in the medication list.   The active problem list includes: Bladder mass, Calculus Of Kidney, Coronary artery disease, Hematuria, High cholesterol, Hypertension, and Hypothyroidism   The patient does not have a history of substance use.   Patient reports his diet is adequate.   Patient reports his physical activity is adequate.   A hearing loss screen was completed today and the result is negative.   Patient does not have any risk factors for depression. Patient completed the PHQ-9 today and it has been scanned in the chart. The total score is 1-4.   The Timed-Up-and-Go screen was administered today and the result is negative.   The Caldwell Index of Parmer in ADLs indicated full function (score of 6).   A Falls Risk Assessment has been completed, including a review of home fall hazards and medication review.   His level of safety is noted to be within normal limits. His balance/gait is within normal limits. There has been a fall without injury in the past year. Patient-specific home safety recommendations have been reviewed and a copy has been given to patient.   He  "denies issues with leaking urine.   There are no additional risk factors identified.   Living Will/Advanced Directive has not previously been completed.   Personalized health advice was given to the patient and a written health screening schedule was established; see Plan for details.       Vitals  Date Time BP Position Site L\R Cuff Size HR RR TEMP (F) WT  HT  BMI kg/m2 BSA m2 O2 Sat FR L/min FiO2 HC       05/07/2020 04:15 PM         184lbs 16oz 5'  10\" 26.54 2.04       11/04/2020 10:25 /72 Sitting    56 - R   195lbs 8oz    98 %  21%              Results  · In-Office Procedures  o Medical procedure  § Vision screening (33616)   § Wearing glasses or contacts?: Yes   § OS (Left): 20/25   § OD (Right): 20/20   § OU (Both): 20/15       Assessment  · Welcome to Medicare preventive visit     V70.0/Z00.00  · Screening for colorectal cancer       Encounter for screening for malignant neoplasm of colon     V76.51/Z12.11  Encounter for screening for malignant neoplasm of rectum     V76.51/Z12.12      Plan  · Orders  o Falls Risk Assessment Completed (0778F) - V70.0/Z00.00 - 11/04/2020  o Brief hearing screening (written) [facility] () - V70.0/Z00.00 - 11/04/2020  o Welcome to Medicare Exam () - V70.0/Z00.00 - 11/04/2020  o ACO-13: Fall Risk Screening with no falls in past year or only one fall without injury in the past year (1101F) - V70.0/Z00.00 - 11/04/2020  o Cologuaunique (76101) - V76.51/Z12.11, V76.51/Z12.12 - 11/04/2020  o ACO - Pt declines to or was not able to provide an Advance Care Plan or name a Surrogate Decision Maker (4354F) - - 11/04/2020  o ACO-39: Current medications updated and reviewed (9579F, ) - - 11/04/2020  · Instructions  o Written health screening schedule for next 5-10 years was established with patient; information scanned in chart and given to patient.  o Fall prevention methods discussed and a copy of recommendations given to patient.            Electronically Signed by: Manuel WEI " Oscar DO -Author on November 4, 2020 10:35:43 AM

## 2021-05-13 NOTE — PROGRESS NOTES
Progress Note      Patient Name: Derek Castelan   Patient ID: [id number]   Sex: Male   Birthdate: Wilma 15, 1945    Primary Care Provider: Huey Hadley MD   Referring Provider: Laurel Orellana MD    Visit Date: May 21, 2020    Provider: Soraida Chao MD   Location: [facility]   Location Address: [address] [place] [address], [address]   [place]  [id number]   Location Phone: [phone]          Chief Complaint  · Preoperative risk assessment      History Of Present Illness  Video Conferencing Visit  Derek Castelan is a 74-year-old gentleman who is presenting for evaluation via video conferencing. Verbal consent obtained before beginning visit. Telehealth due to COVID-19. He has coronary artery disease, previous myocardial infarction, hypertension, hyperlipidemia. He is scheduled to undergo bladder surgery. He has not had any significant chest pain, shortness of breath, PND, orthopnea, palpitations, dizziness or syncope.   The following staff were present during this visit: Provider only.   PAST MEDICAL HISTORY: Positive for coronary artery disease with previous bypass surgery (July 27 x6, LIMA to diagonal 1 and the LAD, SVG to OM1 and OM2, and SVG to sequential graft to the RCA and the PDA), previous myocardial infarction (even when on Clopidogrel), hypertension, hyperlipidemia, hypothyroidism.   FAMILY HISTORY: Positive for hypertension. Negative for diabetes and heart disease.   PSYCHOSOCIAL HISTORY: No history of mood changes or depression. He never used alcohol or tobacco.   CURRENT MEDICATIONS: include Brilinta 60 mg b.i.d.; ASA 81 mg daily; Carvedilol 3.125 mg at bedtime; Levothyroxine 88 mcg daily; Rosuvastatin 40 mg daily; NTG patch 0.2 mg per hour qd, DC qhs; NTG p.r.n. The dosage and frequency of the medications were reviewed with the patient.      He had a chemistry panel on March 9, 2020, which was normal with an HDL of 54, LDL 59,  and a normal thyroid function.  However, he  had evaluation for hematuria on March 27, 2020, wherein his LFTs were abnormal with an AST/ALT of 154 and 54 respectively.       Review of Systems  · Cardiovascular  o Denies  o : palpitations (fast, fluttering, or skipping beats), swelling (feet, ankles, hands), shortness of breath while walking or lying flat, chest pain or angina pectoris   · Respiratory  o Denies  o : chronic or frequent cough, asthma or wheezing      Vitals     Per patient, at-home vitals are blood pressure 120/70, heart rate of 60.           Assessment     1.  Coronary artery disease, without angina pectoris, with previous myocardial infarction and previous bypass       surgery.  2.  Hypertension controlled.  3.  Hyperlipidemia at goal.  4.  Abnormal LFT, probably transient.       Plan     1.  Obtain a stat CBC/chemistry panel today to make sure his liver function is back to normal.  2.  Will have him proceed with surgery as needed.  He can stop his Brilinta 5 days before the procedure but not       his aspirin.  3.  Follow up in 6 months.    Soraida Chao M.D., [facility]  pmm/eleanor           This note was transcribed by Isaura Perkins.  [email or web address]  The above service was transcribed by Isaura Perkins, and I attest to the accuracy of the note.  PMM               Electronically Signed by: Isaura Perkins-, -Author on May 27, 2020 09:49:26 AM  Electronically Co-signed by: Soraida Chao MD -Reviewer on May 28, 2020 09:15:54 AM

## 2021-05-14 VITALS
HEIGHT: 70 IN | DIASTOLIC BLOOD PRESSURE: 90 MMHG | SYSTOLIC BLOOD PRESSURE: 148 MMHG | HEART RATE: 64 BPM | BODY MASS INDEX: 28.49 KG/M2 | WEIGHT: 199 LBS

## 2021-05-14 VITALS
DIASTOLIC BLOOD PRESSURE: 72 MMHG | WEIGHT: 195.5 LBS | OXYGEN SATURATION: 98 % | SYSTOLIC BLOOD PRESSURE: 140 MMHG | HEART RATE: 56 BPM

## 2021-05-14 NOTE — PROGRESS NOTES
Progress Note      Patient Name: Derek Castelan   Patient ID: [id number]   Sex: Male   Birthdate: Wilma 15, 1945    Primary Care Provider: Manuel Moore DO    Visit Date: January 4, 2021    Provider: Soraida Chao MD   Location: [facility]   Location Address: [address] [place] [address], [facility]   [place], [place]  [id number]   Location Phone: [phone]          Chief Complaint     Evaluate coronary artery disease.       History Of Present Illness  Derek Castelan is a 75 year old /White male who denies any chest pain or pressure. No palpitations, shortness of breath, swelling, dizziness, syncope, PND, or orthopnea. Cardiac-wise, he is feeling very well, but he has gained 8 pounds since his last visit. He has had bladder cancer and surgery since he was last here, but no chemotherapy. His blood pressures are monitored at home, but he did not bring his readings and says they are in good range. 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   PAST MEDICAL HISTORY: Coronary artery disease with previous bypass surgery (July 2007 x6, LIMA to diagonal 1 and the LAD, SVG to OM1 and OM2, and SVG to sequential graft to the RCA and PDA); Previous myocardial infarction (even when on clopidogrel); Hyperlipidemia; Hypertension; Hypothyroidism.   PSYCHOSOCIAL HISTORY: Denies tobacco use. Denies alcohol use.   CURRENT MEDICATIONS: Brilinta 60 mg b.i.d. long-term; carvedilol 3.125 mg q. p.m.; levothyroxine 88 mcg q. a.m.; rosuvastatin 40 mg q. p.m.; NitroPatch 0.2 mg/hour; aspirin 81 mg daily; nitroglycerin 0.4 mg p.r.n.; Centrum Silver.      ALLERGIES:  No known drug allergies.       Review of Systems  · Cardiovascular  o Denies  o : palpitations (fast, fluttering, or skipping beats), swelling (feet, ankles, hands), shortness of breath while walking or lying flat, chest pain or angina pectoris   · Respiratory  o Denies  o : chronic or frequent cough      Vitals  Date Time BP  "Position Site L\R Cuff Size HR RR TEMP (F) WT  HT  BMI kg/m2 BSA m2 O2 Sat FR L/min FiO2 HC       01/04/2021 11:18 /90 Sitting    64 - R   199lbs 0oz 5'  10\" 28.55 2.11       01/04/2021 11:18 /90 Sitting    66 - R                   Physical Examination  · Constitutional  o Appearance  o : Awake, alert, in no acute distress, accompanied by family.  · Eyes  o Conjunctivae  o : Conjunctivae normal.  · Ears, Nose, Mouth and Throat  o Oral Cavity  o :   § Oral Mucosa  § : Normal.  · Neck  o Jugular Veins  o : No JVD. Good carotid upstroke. No bruits noted.  · Respiratory  o Respiratory  o : Good respiratory effort. Clear to percussion and auscultation.  · Cardiovascular  o Heart  o : PMI not well felt. S1, S2 normal. No S3. No S4.   o Peripheral Vascular System  o :   § Extremities  § : Good femoral and pedal pulses. No pedal edema.  · Gastrointestinal  o Abdominal Examination  o : Soft. No tenderness or masses felt. No hepatosplenomegaly. Abdominal aorta is not palpable.  · Labs  o Labs  o : BUN 18, creatinine 1.24. Total cholesterol 141, triglycerides 108, HDL 58, LDL 61.          Assessment     1.  Hypertension, uncertain control.  2.  Coronary artery disease with previous MI and bypass without angina.  3.  Hyperlipidemia, at goal.       Plan     1.  Do a blood pressure log, and we will adjust hypertensive medications if needed.    2.  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    3.  Continue Brilinta long-term.  4.  Continue carvedilol for his hypertension.  5.  Continue rosuvastatin for his cholesterols.  6.  Follow up in 6 months with labs and an EKG or earlier if needed.        ANDI Nolan/Soraida Chao MD, FACC  JF:PM:vm               Electronically Signed by: Juana Hussein-, Other -Author on January 6, 2021 12:12:54 PM  Electronically Co-signed by: ANDI Bradshaw -Reviewer on January 7, 2021 08:20:35 AM  Electronically " Co-signed by: Soraida Chao MD -Reviewer on January 7, 2021 06:06:38 PM

## 2021-05-15 VITALS
BODY MASS INDEX: 27.35 KG/M2 | HEART RATE: 56 BPM | DIASTOLIC BLOOD PRESSURE: 74 MMHG | SYSTOLIC BLOOD PRESSURE: 124 MMHG | HEIGHT: 70 IN | WEIGHT: 191 LBS

## 2021-05-15 VITALS
WEIGHT: 196 LBS | OXYGEN SATURATION: 96 % | BODY MASS INDEX: 28.06 KG/M2 | SYSTOLIC BLOOD PRESSURE: 108 MMHG | HEIGHT: 70 IN | HEART RATE: 62 BPM | DIASTOLIC BLOOD PRESSURE: 65 MMHG

## 2021-05-15 VITALS — HEIGHT: 70 IN | BODY MASS INDEX: 26.48 KG/M2 | WEIGHT: 185 LBS

## 2021-05-15 VITALS — RESPIRATION RATE: 14 BRPM | BODY MASS INDEX: 28.2 KG/M2 | HEIGHT: 70 IN | WEIGHT: 197 LBS

## 2021-05-16 VITALS
BODY MASS INDEX: 28.27 KG/M2 | SYSTOLIC BLOOD PRESSURE: 124 MMHG | WEIGHT: 197.5 LBS | HEIGHT: 70 IN | DIASTOLIC BLOOD PRESSURE: 82 MMHG | HEART RATE: 58 BPM

## 2021-05-16 VITALS
BODY MASS INDEX: 28.2 KG/M2 | SYSTOLIC BLOOD PRESSURE: 122 MMHG | DIASTOLIC BLOOD PRESSURE: 72 MMHG | HEIGHT: 70 IN | WEIGHT: 197 LBS | HEART RATE: 58 BPM

## 2021-05-16 VITALS
HEIGHT: 70 IN | WEIGHT: 198.25 LBS | HEART RATE: 52 BPM | BODY MASS INDEX: 28.38 KG/M2 | SYSTOLIC BLOOD PRESSURE: 148 MMHG | DIASTOLIC BLOOD PRESSURE: 98 MMHG

## 2021-05-16 VITALS
HEIGHT: 70 IN | OXYGEN SATURATION: 97 % | HEART RATE: 64 BPM | DIASTOLIC BLOOD PRESSURE: 73 MMHG | WEIGHT: 205.12 LBS | SYSTOLIC BLOOD PRESSURE: 114 MMHG | BODY MASS INDEX: 29.36 KG/M2

## 2021-05-16 VITALS
SYSTOLIC BLOOD PRESSURE: 140 MMHG | WEIGHT: 206 LBS | HEIGHT: 70 IN | BODY MASS INDEX: 29.49 KG/M2 | DIASTOLIC BLOOD PRESSURE: 84 MMHG | HEART RATE: 62 BPM

## 2021-05-16 VITALS
OXYGEN SATURATION: 97 % | SYSTOLIC BLOOD PRESSURE: 121 MMHG | HEART RATE: 55 BPM | BODY MASS INDEX: 28.2 KG/M2 | DIASTOLIC BLOOD PRESSURE: 69 MMHG | WEIGHT: 197 LBS | HEIGHT: 70 IN

## 2021-05-16 VITALS
WEIGHT: 198 LBS | SYSTOLIC BLOOD PRESSURE: 120 MMHG | HEIGHT: 70 IN | HEART RATE: 64 BPM | BODY MASS INDEX: 28.35 KG/M2 | DIASTOLIC BLOOD PRESSURE: 72 MMHG

## 2021-06-28 RX ORDER — LEVOTHYROXINE SODIUM 88 UG/1
1 TABLET ORAL DAILY
COMMUNITY
Start: 2021-05-05 | End: 2021-06-28 | Stop reason: SDUPTHER

## 2021-06-28 NOTE — TELEPHONE ENCOUNTER
PATIENT WAS SEEN 1/4/20201. MEDICATION WAS ORIGINALLY PRESCRIBED BY PCP. PCP LEFT PRACTICE AND THEY CAN'T SEE NEW PCP UNTIL 8/3/2021. REQUESTING ENOUGH MEDICATION UNTIL THAT APPOINTMENT

## 2021-06-29 RX ORDER — LEVOTHYROXINE SODIUM 88 UG/1
88 TABLET ORAL DAILY
Qty: 30 TABLET | Refills: 1 | Status: SHIPPED | OUTPATIENT
Start: 2021-06-29 | End: 2021-08-03 | Stop reason: SDUPTHER

## 2021-07-22 PROBLEM — N32.89 BLADDER MASS: Status: ACTIVE | Noted: 2020-04-08

## 2021-07-22 PROBLEM — I10 HYPERTENSION: Status: ACTIVE | Noted: 2021-07-22

## 2021-07-22 PROBLEM — E03.9 HYPOTHYROIDISM: Status: ACTIVE | Noted: 2021-07-22

## 2021-07-22 PROBLEM — E78.00 HIGH CHOLESTEROL: Status: ACTIVE | Noted: 2021-07-22

## 2021-07-22 PROBLEM — I25.10 CORONARY ARTERY DISEASE: Status: ACTIVE | Noted: 2021-07-22

## 2021-07-22 PROBLEM — R31.9 HEMATURIA: Status: ACTIVE | Noted: 2020-04-08

## 2021-07-22 PROBLEM — N20.0 KIDNEY STONE: Status: ACTIVE | Noted: 2021-07-22

## 2021-07-22 RX ORDER — TICAGRELOR 60 MG/1
60 TABLET ORAL 2 TIMES DAILY
COMMUNITY
Start: 2021-05-12 | End: 2021-12-23 | Stop reason: SDUPTHER

## 2021-07-22 RX ORDER — CLOPIDOGREL BISULFATE 75 MG/1
75 TABLET ORAL DAILY
COMMUNITY
End: 2021-08-04

## 2021-07-22 RX ORDER — ROSUVASTATIN CALCIUM 40 MG/1
40 TABLET, COATED ORAL EVERY EVENING
COMMUNITY
Start: 2021-05-20 | End: 2022-03-07 | Stop reason: SDUPTHER

## 2021-07-22 RX ORDER — ASPIRIN 81 MG/1
TABLET ORAL
COMMUNITY

## 2021-07-30 ENCOUNTER — LAB (OUTPATIENT)
Dept: LAB | Facility: HOSPITAL | Age: 76
End: 2021-07-30

## 2021-07-30 ENCOUNTER — TRANSCRIBE ORDERS (OUTPATIENT)
Dept: LAB | Facility: HOSPITAL | Age: 76
End: 2021-07-30

## 2021-07-30 DIAGNOSIS — I25.118 ATHEROSCLEROSIS OF NATIVE CORONARY ARTERY WITH OTHER FORM OF ANGINA PECTORIS, UNSPECIFIED WHETHER NATIVE OR TRANSPLANTED HEART (HCC): Primary | ICD-10-CM

## 2021-07-30 DIAGNOSIS — Z79.899 ENCOUNTER FOR LONG-TERM (CURRENT) USE OF OTHER MEDICATIONS: ICD-10-CM

## 2021-07-30 DIAGNOSIS — E78.5 HYPERLIPIDEMIA, UNSPECIFIED HYPERLIPIDEMIA TYPE: ICD-10-CM

## 2021-07-30 DIAGNOSIS — I10 ESSENTIAL HYPERTENSION, MALIGNANT: ICD-10-CM

## 2021-07-30 DIAGNOSIS — Z79.01 LONG TERM (CURRENT) USE OF ANTICOAGULANTS: ICD-10-CM

## 2021-07-30 DIAGNOSIS — I25.118 ATHEROSCLEROSIS OF NATIVE CORONARY ARTERY WITH OTHER FORM OF ANGINA PECTORIS, UNSPECIFIED WHETHER NATIVE OR TRANSPLANTED HEART (HCC): ICD-10-CM

## 2021-07-30 LAB
ALBUMIN SERPL-MCNC: 4 G/DL (ref 3.5–5.2)
ALBUMIN/GLOB SERPL: 1.4 G/DL
ALP SERPL-CCNC: 76 U/L (ref 39–117)
ALT SERPL W P-5'-P-CCNC: 31 U/L (ref 1–41)
ANION GAP SERPL CALCULATED.3IONS-SCNC: 8.1 MMOL/L (ref 5–15)
AST SERPL-CCNC: 42 U/L (ref 1–40)
BASOPHILS # BLD AUTO: 0.04 10*3/MM3 (ref 0–0.2)
BASOPHILS NFR BLD AUTO: 0.9 % (ref 0–1.5)
BILIRUB SERPL-MCNC: 0.4 MG/DL (ref 0–1.2)
BUN SERPL-MCNC: 17 MG/DL (ref 8–23)
BUN/CREAT SERPL: 14 (ref 7–25)
CALCIUM SPEC-SCNC: 8.7 MG/DL (ref 8.6–10.5)
CHLORIDE SERPL-SCNC: 107 MMOL/L (ref 98–107)
CHOLEST SERPL-MCNC: 137 MG/DL (ref 0–200)
CO2 SERPL-SCNC: 23.9 MMOL/L (ref 22–29)
CREAT SERPL-MCNC: 1.21 MG/DL (ref 0.76–1.27)
DEPRECATED RDW RBC AUTO: 45.5 FL (ref 37–54)
EOSINOPHIL # BLD AUTO: 0.14 10*3/MM3 (ref 0–0.4)
EOSINOPHIL NFR BLD AUTO: 3.2 % (ref 0.3–6.2)
ERYTHROCYTE [DISTWIDTH] IN BLOOD BY AUTOMATED COUNT: 14.3 % (ref 12.3–15.4)
GFR SERPL CREATININE-BSD FRML MDRD: 59 ML/MIN/1.73
GLOBULIN UR ELPH-MCNC: 2.9 GM/DL
GLUCOSE SERPL-MCNC: 90 MG/DL (ref 65–99)
HCT VFR BLD AUTO: 36.3 % (ref 37.5–51)
HDLC SERPL-MCNC: 55 MG/DL (ref 40–60)
HGB BLD-MCNC: 11.9 G/DL (ref 13–17.7)
IMM GRANULOCYTES # BLD AUTO: 0.01 10*3/MM3 (ref 0–0.05)
IMM GRANULOCYTES NFR BLD AUTO: 0.2 % (ref 0–0.5)
LDLC SERPL CALC-MCNC: 70 MG/DL (ref 0–100)
LDLC/HDLC SERPL: 1.29 {RATIO}
LYMPHOCYTES # BLD AUTO: 1.65 10*3/MM3 (ref 0.7–3.1)
LYMPHOCYTES NFR BLD AUTO: 37.5 % (ref 19.6–45.3)
MCH RBC QN AUTO: 29 PG (ref 26.6–33)
MCHC RBC AUTO-ENTMCNC: 32.8 G/DL (ref 31.5–35.7)
MCV RBC AUTO: 88.5 FL (ref 79–97)
MONOCYTES # BLD AUTO: 0.44 10*3/MM3 (ref 0.1–0.9)
MONOCYTES NFR BLD AUTO: 10 % (ref 5–12)
NEUTROPHILS NFR BLD AUTO: 2.12 10*3/MM3 (ref 1.7–7)
NEUTROPHILS NFR BLD AUTO: 48.2 % (ref 42.7–76)
NRBC BLD AUTO-RTO: 0 /100 WBC (ref 0–0.2)
PLATELET # BLD AUTO: 170 10*3/MM3 (ref 140–450)
PMV BLD AUTO: 10.9 FL (ref 6–12)
POTASSIUM SERPL-SCNC: 4.1 MMOL/L (ref 3.5–5.2)
PROT SERPL-MCNC: 6.9 G/DL (ref 6–8.5)
RBC # BLD AUTO: 4.1 10*6/MM3 (ref 4.14–5.8)
SODIUM SERPL-SCNC: 139 MMOL/L (ref 136–145)
TRIGL SERPL-MCNC: 56 MG/DL (ref 0–150)
VLDLC SERPL-MCNC: 12 MG/DL (ref 5–40)
WBC # BLD AUTO: 4.4 10*3/MM3 (ref 3.4–10.8)

## 2021-07-30 PROCEDURE — 80053 COMPREHEN METABOLIC PANEL: CPT

## 2021-07-30 PROCEDURE — 85025 COMPLETE CBC W/AUTO DIFF WBC: CPT

## 2021-07-30 PROCEDURE — 80061 LIPID PANEL: CPT

## 2021-07-30 PROCEDURE — 36415 COLL VENOUS BLD VENIPUNCTURE: CPT

## 2021-08-01 PROBLEM — E78.2 MIXED HYPERLIPIDEMIA: Status: ACTIVE | Noted: 2021-08-01

## 2021-08-01 PROBLEM — E03.4 ATROPHY OF THYROID: Status: ACTIVE | Noted: 2021-08-01

## 2021-08-01 PROBLEM — C68.9: Status: ACTIVE | Noted: 2021-08-01

## 2021-08-01 PROBLEM — R31.9 HEMATURIA: Status: RESOLVED | Noted: 2020-04-08 | Resolved: 2021-08-01

## 2021-08-01 PROBLEM — E78.00 HIGH CHOLESTEROL: Status: RESOLVED | Noted: 2021-07-22 | Resolved: 2021-08-01

## 2021-08-01 PROBLEM — Z95.1 S/P CABG X 6: Status: ACTIVE | Noted: 2021-08-01

## 2021-08-03 ENCOUNTER — OFFICE VISIT (OUTPATIENT)
Dept: INTERNAL MEDICINE | Facility: CLINIC | Age: 76
End: 2021-08-03

## 2021-08-03 VITALS
OXYGEN SATURATION: 98 % | DIASTOLIC BLOOD PRESSURE: 82 MMHG | BODY MASS INDEX: 28.12 KG/M2 | HEART RATE: 54 BPM | SYSTOLIC BLOOD PRESSURE: 166 MMHG | HEIGHT: 70 IN | TEMPERATURE: 97.3 F | WEIGHT: 196.4 LBS

## 2021-08-03 DIAGNOSIS — C68.9 UROTHELIAL CARCINOMA WITH HIGH RISK OF RECURRENCE (HCC): ICD-10-CM

## 2021-08-03 DIAGNOSIS — R53.83 OTHER FATIGUE: ICD-10-CM

## 2021-08-03 DIAGNOSIS — I10 ESSENTIAL HYPERTENSION: ICD-10-CM

## 2021-08-03 DIAGNOSIS — Z95.1 S/P CABG X 6: ICD-10-CM

## 2021-08-03 DIAGNOSIS — E78.2 MIXED HYPERLIPIDEMIA: ICD-10-CM

## 2021-08-03 DIAGNOSIS — D50.0 IRON DEFICIENCY ANEMIA DUE TO CHRONIC BLOOD LOSS: ICD-10-CM

## 2021-08-03 DIAGNOSIS — E03.4 ATROPHY OF THYROID: Primary | ICD-10-CM

## 2021-08-03 PROCEDURE — 99204 OFFICE O/P NEW MOD 45 MIN: CPT | Performed by: INTERNAL MEDICINE

## 2021-08-03 RX ORDER — CARVEDILOL 3.12 MG/1
3.12 TABLET ORAL EVERY EVENING
COMMUNITY
End: 2021-08-04 | Stop reason: SDUPTHER

## 2021-08-03 RX ORDER — NITROGLYCERIN 0.4 MG/1
0.4 TABLET SUBLINGUAL
COMMUNITY
End: 2021-08-04 | Stop reason: SDUPTHER

## 2021-08-03 RX ORDER — NITROGLYCERIN 40 MG/1
1 PATCH TRANSDERMAL DAILY
COMMUNITY
End: 2022-01-19

## 2021-08-03 RX ORDER — FERROUS SULFATE 325(65) MG
325 TABLET ORAL EVERY OTHER DAY
Qty: 45 TABLET | Refills: 1 | Status: SHIPPED | OUTPATIENT
Start: 2021-08-03 | End: 2022-03-16

## 2021-08-04 ENCOUNTER — OFFICE VISIT (OUTPATIENT)
Dept: CARDIOLOGY | Facility: CLINIC | Age: 76
End: 2021-08-04

## 2021-08-04 VITALS
BODY MASS INDEX: 28.2 KG/M2 | SYSTOLIC BLOOD PRESSURE: 124 MMHG | DIASTOLIC BLOOD PRESSURE: 80 MMHG | WEIGHT: 197 LBS | HEART RATE: 60 BPM | HEIGHT: 70 IN

## 2021-08-04 DIAGNOSIS — I25.10 CORONARY ARTERY DISEASE INVOLVING NATIVE CORONARY ARTERY OF NATIVE HEART, ANGINA PRESENCE UNSPECIFIED: Primary | ICD-10-CM

## 2021-08-04 DIAGNOSIS — I10 ESSENTIAL HYPERTENSION: ICD-10-CM

## 2021-08-04 DIAGNOSIS — Z95.1 S/P CABG X 6: ICD-10-CM

## 2021-08-04 DIAGNOSIS — E78.2 MIXED HYPERLIPIDEMIA: ICD-10-CM

## 2021-08-04 PROCEDURE — 99214 OFFICE O/P EST MOD 30 MIN: CPT | Performed by: INTERNAL MEDICINE

## 2021-08-04 PROCEDURE — 93000 ELECTROCARDIOGRAM COMPLETE: CPT | Performed by: INTERNAL MEDICINE

## 2021-08-04 RX ORDER — LEVOTHYROXINE SODIUM 88 UG/1
88 TABLET ORAL DAILY
Qty: 30 TABLET | Refills: 1 | Status: SHIPPED | OUTPATIENT
Start: 2021-08-04 | End: 2021-08-04 | Stop reason: SDUPTHER

## 2021-08-04 RX ORDER — NITROGLYCERIN 0.4 MG/1
0.4 TABLET SUBLINGUAL
Qty: 25 TABLET | Refills: 3 | Status: SHIPPED | OUTPATIENT
Start: 2021-08-04

## 2021-08-04 RX ORDER — CARVEDILOL 3.12 MG/1
3.12 TABLET ORAL EVERY EVENING
Qty: 90 TABLET | Refills: 3 | Status: SHIPPED | OUTPATIENT
Start: 2021-08-04 | End: 2022-03-07 | Stop reason: SDUPTHER

## 2021-08-04 RX ORDER — LEVOTHYROXINE SODIUM 88 UG/1
88 TABLET ORAL DAILY
Qty: 90 TABLET | Refills: 3 | Status: SHIPPED | OUTPATIENT
Start: 2021-08-04 | End: 2022-03-07 | Stop reason: SDUPTHER

## 2021-08-04 NOTE — ASSESSMENT & PLAN NOTE
Coronary artery disease with previous bypass surgery (July 2007 x6, LIMA to diagonal 1 and the LAD, SVG to OM1 and OM2, and SVG to sequential graft to the RCA and PDA); no episodes of angina  for many years

## 2021-08-04 NOTE — ASSESSMENT & PLAN NOTE
Lipid abnormalities are reasonably well controlled.  Generally his LDL is always below 70.  This time is exactly 7.  On inquiry, he admits not being very good with his diet.  I have suggested that he needs to go on a stricter diet lose a few pounds and that will take care of his LDL.  For the time being and continue his rosuvastatin alone at 40 mg a day and consider adding Zetia only if it becomes greater than 70

## 2021-08-04 NOTE — ASSESSMENT & PLAN NOTE
Hypertension is controlled.  His blood pressure at Dr. Millan's office yesterday was quite elevated.  However that was his first visit to his office.  I have asked him and his wife to maintain a 2-week blood pressure log and bring it to us now and also do now and 2 weeks before his next appointment

## 2021-08-04 NOTE — PROGRESS NOTES
Chief Complaint  Follow-up and Coronary Artery Disease    Subjective            Derek Castelan presents to [facility]  Derek is a 76 years old gentleman with previous bypass surgery previous MI, hypertension, hyperlipidemia who is doing very well.  He still drives a schoolbus.  Denies any chest pain palpitation shortness of breath dizziness or syncope      Past Medical History:   Diagnosis Date   • Bladder mass 04/08/2020   • Calculus of kidney    • Cancer (CMS/HCC)     Bladder Cancer   • Coronary artery disease    • Hematuria 04/08/2020   • High cholesterol    • Hypertension    • Hypothyroidism        No Known Allergies     Past Surgical History:   Procedure Laterality Date   • BLADDER TUMOR EXCISION     • CARDIAC SURGERY  2007    open heart   • HERNIA REPAIR  2001   • SKIN CANCER EXCISION      bascal cell        Social History     Tobacco Use   • Smoking status: Never Smoker   • Smokeless tobacco: Never Used   Vaping Use   • Vaping Use: Never used   Substance Use Topics   • Alcohol use: Never   • Drug use: Never       Family History   Problem Relation Age of Onset   • Heart disease Mother    • Diabetes Mother    • Hypertension Mother    • Cancer Sister    • Heart disease Brother         Prior to Admission medications    Medication Sig Start Date End Date Taking? Authorizing Provider   aspirin (aspirin) 81 MG EC tablet aspirin 81 mg oral tablet,delayed release (DR/EC) take 1 tablet (81 mg) by oral route once daily   Active   Yes Lisa Monsalve MD   Brilinta 60 MG tablet tablet Take 60 mg by mouth 2 (Two) Times a Day. 5/12/21  Yes Lisa Monsalve MD   carvedilol (COREG) 3.125 MG tablet Take 3.125 mg by mouth Every Evening.   Yes Lisa Monsalve MD   levothyroxine (SYNTHROID, LEVOTHROID) 88 MCG tablet Take 1 tablet by mouth Daily. 8/4/21  Yes Richard Millan MD   nitroglycerin (NITRODUR) 0.2 MG/HR patch Place 1 patch on the skin as directed by provider Daily.   Yes  "Lisa Monsalve MD   nitroglycerin (NITROSTAT) 0.4 MG SL tablet Place 0.4 mg under the tongue Every 5 (Five) Minutes As Needed for Chest Pain. Take no more than 3 doses in 15 minutes.   Yes Lisa Monsalve MD   rosuvastatin (CRESTOR) 40 MG tablet Take 40 mg by mouth Every Evening. 5/20/21  Yes Lisa Monsalve MD   clopidogrel (Plavix) 75 MG tablet Take 75 mg by mouth Daily.    Lisa Monsalve MD   ferrous sulfate 325 (65 FE) MG tablet Take 1 tablet by mouth Every Other Day. 8/3/21   YanaRichard MD        Review of Systems   Constitutional: Negative for fatigue.   Respiratory: Negative for cough and shortness of breath.    Cardiovascular: Negative for chest pain, palpitations and leg swelling.   Neurological: Negative for dizziness.        Objective     /80   Pulse 60   Ht 177.8 cm (70\")   Wt 89.4 kg (197 lb)   BMI 28.27 kg/m²       Physical Exam  Constitutional:       General: He is awake.      Appearance: Normal appearance.   Neck:      Thyroid: No thyromegaly.      Vascular: No carotid bruit or JVD.   Cardiovascular:      Rate and Rhythm: Normal rate and regular rhythm.      Chest Wall: PMI is not displaced.      Pulses: Normal pulses.      Heart sounds: Normal heart sounds, S1 normal and S2 normal. No murmur heard.   No friction rub. No gallop. No S3 or S4 sounds.    Pulmonary:      Effort: Pulmonary effort is normal.      Breath sounds: Normal breath sounds and air entry. No wheezing, rhonchi or rales.   Abdominal:      General: Bowel sounds are normal.      Palpations: Abdomen is soft. There is no mass.      Tenderness: There is no abdominal tenderness.   Musculoskeletal:      Cervical back: Neck supple.      Right lower leg: No edema.      Left lower leg: No edema.   Neurological:      Mental Status: He is alert and oriented to person, place, and time.   Psychiatric:         Mood and Affect: Mood normal.         Behavior: Behavior is cooperative.       No results found for: " PROBNP, BNP  CMP    CMP 12/29/20 7/30/21   Glucose  90   Glucose 98    BUN 18 17   Creatinine 1.24 (A) 1.21   eGFR Non African Am  59 (A)   Sodium 140 139   Potassium 4.3 4.1   Chloride 105 107   Calcium 9.1 8.7   Albumin 3.9 4.00   Total Bilirubin 0.29 0.4   Alkaline Phosphatase 91 76   AST (SGOT) 33 42 (A)   ALT (SGPT) 24 31   (A) Abnormal value       Comments are available for some flowsheets but are not being displayed.           CBC w/diff    CBC w/Diff 7/30/21   WBC 4.40   RBC 4.10 (A)   Hemoglobin 11.9 (A)   Hematocrit 36.3 (A)   MCV 88.5   MCH 29.0   MCHC 32.8   RDW 14.3   Platelets 170   Neutrophil Rel % 48.2   Immature Granulocyte Rel % 0.2   Lymphocyte Rel % 37.5   Monocyte Rel % 10.0   Eosinophil Rel % 3.2   Basophil Rel % 0.9   (A) Abnormal value             Lipid Panel    Lipid Panel 12/29/20 7/30/21   Total Cholesterol  137   Total Cholesterol 141    Triglycerides 108 56   HDL Cholesterol 58 55   VLDL Cholesterol 22 12   LDL Cholesterol  61 (A) 70   LDL/HDL Ratio  1.29   (A) Abnormal value       Comments are available for some flowsheets but are not being displayed.            Lab Results   Component Value Date    TSH 2.680 03/09/2020    TSH 1.320 08/29/2019    TSH 1.370 02/27/2019      Lab Results   Component Value Date    FREET4 1.3 03/09/2020    FREET4 1.5 08/29/2019    FREET4 1.5 02/27/2019      No results found for: DDIMERQUANT  No results found for: MG   No results found for: DIGOXIN          Procedures     Result Review :                    ECG 12 Lead    Date/Time: 8/4/2021 3:12 PM  Performed by: Soraida Chao MD  Authorized by: Soraida Chao MD   Comparison: compared with previous ECG   Comments: Sinus rhythm slightly prolonged MA interval, left axis deviation, left anterior fascicular block, old inferior MI age-indeterminate.  No change compared to previous EKG                Assessment and Plan        Diagnoses and all orders for this visit:    1. Essential hypertension  (Primary)  Assessment & Plan:  Hypertension is controlled.  His blood pressure at Dr. Millan's office yesterday was quite elevated.  However that was his first visit to his office.  I have asked him and his wife to maintain a 2-week blood pressure log and bring it to us now and also do now and 2 weeks before his next appointment    Orders:  -     Lipid Panel; Future  -     Comprehensive Metabolic Panel; Future  -     Magnesium; Future    2. Coronary artery disease involving native coronary artery of native heart, angina presence unspecified  Assessment & Plan:  Coronary artery disease with previous bypass surgery (July 2007 x6, LIMA to diagonal 1 and the LAD, SVG to OM1 and OM2, and SVG to sequential graft to the RCA and PDA); no episodes of angina  for many years    Orders:  -     Lipid Panel; Future  -     Comprehensive Metabolic Panel; Future  -     Magnesium; Future    3. Mixed hyperlipidemia  Assessment & Plan:  Lipid abnormalities are reasonably well controlled.  Generally his LDL is always below 70.  This time is exactly 7.  On inquiry, he admits not being very good with his diet.  I have suggested that he needs to go on a stricter diet lose a few pounds and that will take care of his LDL.  For the time being and continue his rosuvastatin alone at 40 mg a day and consider adding Zetia only if it becomes greater than 70    Orders:  -     Lipid Panel; Future  -     Comprehensive Metabolic Panel; Future  -     Magnesium; Future    4. S/P CABG x 6  -     Lipid Panel; Future  -     Comprehensive Metabolic Panel; Future  -     Magnesium; Future    Other orders  -     nitroglycerin (NITROSTAT) 0.4 MG SL tablet; Place 1 tablet under the tongue Every 5 (Five) Minutes As Needed for Chest Pain. Take no more than 3 doses in 15 minutes.  Dispense: 25 tablet; Refill: 3  -     levothyroxine (SYNTHROID, LEVOTHROID) 88 MCG tablet; Take 1 tablet by mouth Daily.  Dispense: 90 tablet; Refill: 3  -     carvedilol (COREG) 3.125 MG  tablet; Take 1 tablet by mouth Every Evening.  Dispense: 90 tablet; Refill: 3  -     ECG 12 Lead          Follow Up     Return in about 7 months (around 3/7/2022) for ekg with next visit.    Patient was given instructions and counseling regarding his condition or for health maintenance advice. Please see specific information pulled into the AVS if appropriate.

## 2021-10-14 ENCOUNTER — TELEPHONE (OUTPATIENT)
Dept: CARDIOLOGY | Facility: CLINIC | Age: 76
End: 2021-10-14

## 2021-10-14 DIAGNOSIS — Z95.1 S/P CABG X 6: ICD-10-CM

## 2021-10-14 DIAGNOSIS — I25.10 CORONARY ARTERY DISEASE INVOLVING NATIVE CORONARY ARTERY OF NATIVE HEART, UNSPECIFIED WHETHER ANGINA PRESENT: Primary | ICD-10-CM

## 2021-10-14 NOTE — TELEPHONE ENCOUNTER
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    Last Seen: 8/4/2021 and was stable    Last SPECT: 10/2018  1. Abnormal SPECT perfusion study that demonstrates a moderate-sized inferior and inferolateral myocardial infarction with minimal reversibility and preserved left ventricular systolic function with mild inferior hypokinesis.  2. No symptoms at maximum exercised.  3. No ischemic EKG changes noted.    Patient will need recent stress, please advise if ok to proceed.

## 2021-11-02 ENCOUNTER — HOSPITAL ENCOUNTER (OUTPATIENT)
Dept: NUCLEAR MEDICINE | Facility: HOSPITAL | Age: 76
Discharge: HOME | End: 2021-11-02

## 2021-11-02 DIAGNOSIS — I25.10 CORONARY ARTERY DISEASE INVOLVING NATIVE CORONARY ARTERY OF NATIVE HEART, UNSPECIFIED WHETHER ANGINA PRESENT: ICD-10-CM

## 2021-11-02 DIAGNOSIS — Z95.1 S/P CABG X 6: ICD-10-CM

## 2021-11-02 LAB
BH CV IMMEDIATE POST RECOVERY TECH DATA SYMPTOMS: NORMAL
BH CV IMMEDIATE POST TECH DATA BLOOD PRESSURE: NORMAL MMHG
BH CV IMMEDIATE POST TECH DATA HEART RATE: 103 BPM
BH CV IMMEDIATE POST TECH DATA OXYGEN SATS: 99 %
BH CV REST NUCLEAR ISOTOPE DOSE: 9.5 MCI
BH CV SIX MINUTE RECOVERY TECH DATA BLOOD PRESSURE: NORMAL
BH CV SIX MINUTE RECOVERY TECH DATA HEART RATE: 66 BPM
BH CV SIX MINUTE RECOVERY TECH DATA OXYGEN SATURATION: 98 %
BH CV STRESS BP STAGE 1: NORMAL
BH CV STRESS BP STAGE 2: NORMAL
BH CV STRESS DURATION MIN STAGE 1: 3
BH CV STRESS DURATION MIN STAGE 2: 3
BH CV STRESS DURATION SEC STAGE 1: 0
BH CV STRESS DURATION SEC STAGE 2: 0
BH CV STRESS GRADE STAGE 1: 10
BH CV STRESS GRADE STAGE 2: 12
BH CV STRESS HR STAGE 1: 112
BH CV STRESS HR STAGE 2: 133
BH CV STRESS METS STAGE 1: 5
BH CV STRESS METS STAGE 2: 7.5
BH CV STRESS NUCLEAR ISOTOPE DOSE: 37.4 MCI
BH CV STRESS O2 STAGE 1: 93
BH CV STRESS PROTOCOL 1: NORMAL
BH CV STRESS SPEED STAGE 1: 1.7
BH CV STRESS SPEED STAGE 2: 2.5
BH CV STRESS STAGE 1: 1
BH CV STRESS STAGE 2: 2
BH CV THREE MINUTE POST TECH DATA BLOOD PRESSURE: NORMAL MMHG
BH CV THREE MINUTE POST TECH DATA HEART RATE: 76 BPM
BH CV THREE MINUTE POST TECH DATA OXYGEN SATURATION: 99 %
LV EF NUC BP: 41 %
MAXIMAL PREDICTED HEART RATE: 146 BPM
PERCENT MAX PREDICTED HR: 94.52 %
STRESS BASELINE BP: NORMAL MMHG
STRESS BASELINE HR: 51 BPM
STRESS O2 SAT REST: 97 %
STRESS PERCENT HR: 111 %
STRESS POST ESTIMATED WORKLOAD: 6.3 METS
STRESS POST EXERCISE DUR MIN: 6 MIN
STRESS POST EXERCISE DUR SEC: 0 SEC
STRESS POST PEAK HR: 138 BPM
STRESS TARGET HR: 124 BPM

## 2021-11-02 PROCEDURE — 0 TECHNETIUM TETROFOSMIN KIT: Performed by: INTERNAL MEDICINE

## 2021-11-02 PROCEDURE — 93017 CV STRESS TEST TRACING ONLY: CPT

## 2021-11-02 PROCEDURE — A9502 TC99M TETROFOSMIN: HCPCS | Performed by: INTERNAL MEDICINE

## 2021-11-02 PROCEDURE — 78452 HT MUSCLE IMAGE SPECT MULT: CPT

## 2021-11-02 PROCEDURE — 78452 HT MUSCLE IMAGE SPECT MULT: CPT | Performed by: INTERNAL MEDICINE

## 2021-11-02 PROCEDURE — 93018 CV STRESS TEST I&R ONLY: CPT | Performed by: INTERNAL MEDICINE

## 2021-11-02 PROCEDURE — 93016 CV STRESS TEST SUPVJ ONLY: CPT | Performed by: NURSE PRACTITIONER

## 2021-11-02 RX ADMIN — TETROFOSMIN 1 DOSE: 1.38 INJECTION, POWDER, LYOPHILIZED, FOR SOLUTION INTRAVENOUS at 08:42

## 2021-11-02 RX ADMIN — TETROFOSMIN 1 DOSE: 1.38 INJECTION, POWDER, LYOPHILIZED, FOR SOLUTION INTRAVENOUS at 07:23

## 2021-11-03 ENCOUNTER — TELEPHONE (OUTPATIENT)
Dept: CARDIOLOGY | Facility: CLINIC | Age: 76
End: 2021-11-03

## 2021-11-03 NOTE — TELEPHONE ENCOUNTER
----- Message from Soraida Chao MD sent at 11/2/2021  4:50 PM EDT -----  Patients stress test is essentially unchanged from before.  Okay for DOT

## 2021-12-23 RX ORDER — TICAGRELOR 60 MG/1
60 TABLET ORAL 2 TIMES DAILY
Qty: 180 TABLET | Refills: 3 | Status: SHIPPED | OUTPATIENT
Start: 2021-12-23 | End: 2022-03-07 | Stop reason: SDUPTHER

## 2022-01-19 RX ORDER — NITROGLYCERIN 40 MG/1
PATCH TRANSDERMAL
Qty: 90 PATCH | Refills: 1 | Status: SHIPPED | OUTPATIENT
Start: 2022-01-19 | End: 2022-11-23 | Stop reason: SDUPTHER

## 2022-03-01 ENCOUNTER — LAB (OUTPATIENT)
Dept: LAB | Facility: HOSPITAL | Age: 77
End: 2022-03-01

## 2022-03-01 DIAGNOSIS — D50.0 IRON DEFICIENCY ANEMIA DUE TO CHRONIC BLOOD LOSS: ICD-10-CM

## 2022-03-01 DIAGNOSIS — I25.10 CORONARY ARTERY DISEASE INVOLVING NATIVE CORONARY ARTERY OF NATIVE HEART: ICD-10-CM

## 2022-03-01 DIAGNOSIS — R53.83 OTHER FATIGUE: ICD-10-CM

## 2022-03-01 DIAGNOSIS — E78.2 MIXED HYPERLIPIDEMIA: ICD-10-CM

## 2022-03-01 DIAGNOSIS — E03.4 ATROPHY OF THYROID: ICD-10-CM

## 2022-03-01 DIAGNOSIS — I10 ESSENTIAL HYPERTENSION: ICD-10-CM

## 2022-03-01 DIAGNOSIS — Z95.1 S/P CABG X 6: ICD-10-CM

## 2022-03-01 LAB
ALBUMIN SERPL-MCNC: 4.4 G/DL (ref 3.5–5.2)
ALBUMIN/GLOB SERPL: 1.6 G/DL
ALP SERPL-CCNC: 86 U/L (ref 39–117)
ALT SERPL W P-5'-P-CCNC: 40 U/L (ref 1–41)
ANION GAP SERPL CALCULATED.3IONS-SCNC: 12 MMOL/L (ref 5–15)
AST SERPL-CCNC: 42 U/L (ref 1–40)
BASOPHILS # BLD AUTO: 0.03 10*3/MM3 (ref 0–0.2)
BASOPHILS NFR BLD AUTO: 0.3 % (ref 0–1.5)
BILIRUB SERPL-MCNC: 0.5 MG/DL (ref 0–1.2)
BUN SERPL-MCNC: 20 MG/DL (ref 8–23)
BUN/CREAT SERPL: 17.4 (ref 7–25)
CALCIUM SPEC-SCNC: 9.2 MG/DL (ref 8.6–10.5)
CHLORIDE SERPL-SCNC: 104 MMOL/L (ref 98–107)
CHOLEST SERPL-MCNC: 148 MG/DL (ref 0–200)
CO2 SERPL-SCNC: 24 MMOL/L (ref 22–29)
CREAT SERPL-MCNC: 1.15 MG/DL (ref 0.76–1.27)
DEPRECATED RDW RBC AUTO: 43.1 FL (ref 37–54)
EGFRCR SERPLBLD CKD-EPI 2021: 66.8 ML/MIN/1.73
EOSINOPHIL # BLD AUTO: 0.18 10*3/MM3 (ref 0–0.4)
EOSINOPHIL NFR BLD AUTO: 1.5 % (ref 0.3–6.2)
ERYTHROCYTE [DISTWIDTH] IN BLOOD BY AUTOMATED COUNT: 12.7 % (ref 12.3–15.4)
FERRITIN SERPL-MCNC: 68.5 NG/ML (ref 30–400)
FOLATE SERPL-MCNC: >20 NG/ML (ref 4.78–24.2)
GLOBULIN UR ELPH-MCNC: 2.8 GM/DL
GLUCOSE SERPL-MCNC: 89 MG/DL (ref 65–99)
HCT VFR BLD AUTO: 41.4 % (ref 37.5–51)
HDLC SERPL-MCNC: 55 MG/DL (ref 40–60)
HGB BLD-MCNC: 14.1 G/DL (ref 13–17.7)
IMM GRANULOCYTES # BLD AUTO: 0.04 10*3/MM3 (ref 0–0.05)
IMM GRANULOCYTES NFR BLD AUTO: 0.3 % (ref 0–0.5)
IRON 24H UR-MRATE: 73 MCG/DL (ref 59–158)
IRON SATN MFR SERPL: 17 % (ref 20–50)
LDLC SERPL CALC-MCNC: 76 MG/DL (ref 0–100)
LDLC/HDLC SERPL: 1.35 {RATIO}
LYMPHOCYTES # BLD AUTO: 1.89 10*3/MM3 (ref 0.7–3.1)
LYMPHOCYTES NFR BLD AUTO: 15.9 % (ref 19.6–45.3)
MAGNESIUM SERPL-MCNC: 1.7 MG/DL (ref 1.6–2.4)
MCH RBC QN AUTO: 31.8 PG (ref 26.6–33)
MCHC RBC AUTO-ENTMCNC: 34.1 G/DL (ref 31.5–35.7)
MCV RBC AUTO: 93.2 FL (ref 79–97)
MONOCYTES # BLD AUTO: 0.7 10*3/MM3 (ref 0.1–0.9)
MONOCYTES NFR BLD AUTO: 5.9 % (ref 5–12)
NEUTROPHILS NFR BLD AUTO: 76.1 % (ref 42.7–76)
NEUTROPHILS NFR BLD AUTO: 9.04 10*3/MM3 (ref 1.7–7)
NRBC BLD AUTO-RTO: 0 /100 WBC (ref 0–0.2)
PLATELET # BLD AUTO: 178 10*3/MM3 (ref 140–450)
PMV BLD AUTO: 10.5 FL (ref 6–12)
POTASSIUM SERPL-SCNC: 4.1 MMOL/L (ref 3.5–5.2)
PROT SERPL-MCNC: 7.2 G/DL (ref 6–8.5)
RBC # BLD AUTO: 4.44 10*6/MM3 (ref 4.14–5.8)
SODIUM SERPL-SCNC: 140 MMOL/L (ref 136–145)
T4 FREE SERPL-MCNC: 1.34 NG/DL (ref 0.93–1.7)
TIBC SERPL-MCNC: 429 MCG/DL (ref 298–536)
TRANSFERRIN SERPL-MCNC: 288 MG/DL (ref 200–360)
TRIGL SERPL-MCNC: 94 MG/DL (ref 0–150)
TSH SERPL DL<=0.05 MIU/L-ACNC: 1.99 UIU/ML (ref 0.27–4.2)
VIT B12 BLD-MCNC: 197 PG/ML (ref 211–946)
VLDLC SERPL-MCNC: 17 MG/DL (ref 5–40)
WBC NRBC COR # BLD: 11.88 10*3/MM3 (ref 3.4–10.8)

## 2022-03-01 PROCEDURE — 84443 ASSAY THYROID STIM HORMONE: CPT

## 2022-03-01 PROCEDURE — 82728 ASSAY OF FERRITIN: CPT

## 2022-03-01 PROCEDURE — 80061 LIPID PANEL: CPT

## 2022-03-01 PROCEDURE — 84466 ASSAY OF TRANSFERRIN: CPT

## 2022-03-01 PROCEDURE — 82607 VITAMIN B-12: CPT

## 2022-03-01 PROCEDURE — 83540 ASSAY OF IRON: CPT

## 2022-03-01 PROCEDURE — 82746 ASSAY OF FOLIC ACID SERUM: CPT

## 2022-03-01 PROCEDURE — 83735 ASSAY OF MAGNESIUM: CPT

## 2022-03-01 PROCEDURE — 80053 COMPREHEN METABOLIC PANEL: CPT

## 2022-03-01 PROCEDURE — 85025 COMPLETE CBC W/AUTO DIFF WBC: CPT

## 2022-03-01 PROCEDURE — 84439 ASSAY OF FREE THYROXINE: CPT

## 2022-03-01 PROCEDURE — 36415 COLL VENOUS BLD VENIPUNCTURE: CPT

## 2022-03-07 ENCOUNTER — OFFICE VISIT (OUTPATIENT)
Dept: CARDIOLOGY | Facility: CLINIC | Age: 77
End: 2022-03-07

## 2022-03-07 VITALS
BODY MASS INDEX: 27.49 KG/M2 | SYSTOLIC BLOOD PRESSURE: 135 MMHG | DIASTOLIC BLOOD PRESSURE: 74 MMHG | HEIGHT: 70 IN | WEIGHT: 192 LBS | HEART RATE: 72 BPM

## 2022-03-07 DIAGNOSIS — Z95.1 S/P CABG X 6: ICD-10-CM

## 2022-03-07 DIAGNOSIS — I25.810 CORONARY ARTERY DISEASE INVOLVING CORONARY BYPASS GRAFT OF NATIVE HEART WITHOUT ANGINA PECTORIS: Primary | Chronic | ICD-10-CM

## 2022-03-07 DIAGNOSIS — I10 ESSENTIAL HYPERTENSION: Chronic | ICD-10-CM

## 2022-03-07 DIAGNOSIS — E78.2 MIXED HYPERLIPIDEMIA: Chronic | ICD-10-CM

## 2022-03-07 PROCEDURE — 99214 OFFICE O/P EST MOD 30 MIN: CPT | Performed by: INTERNAL MEDICINE

## 2022-03-07 PROCEDURE — 93000 ELECTROCARDIOGRAM COMPLETE: CPT | Performed by: INTERNAL MEDICINE

## 2022-03-07 RX ORDER — ROSUVASTATIN CALCIUM 40 MG/1
TABLET, COATED ORAL
Qty: 90 TABLET | OUTPATIENT
Start: 2022-03-07

## 2022-03-07 RX ORDER — TICAGRELOR 60 MG/1
60 TABLET ORAL 2 TIMES DAILY
Qty: 180 TABLET | Refills: 3 | Status: SHIPPED | OUTPATIENT
Start: 2022-03-07 | End: 2023-02-03 | Stop reason: SDUPTHER

## 2022-03-07 RX ORDER — DOCUSATE SODIUM 100 MG/1
100 CAPSULE, LIQUID FILLED ORAL 2 TIMES DAILY
COMMUNITY

## 2022-03-07 RX ORDER — LEVOTHYROXINE SODIUM 88 UG/1
88 TABLET ORAL DAILY
Qty: 90 TABLET | Refills: 3 | Status: SHIPPED | OUTPATIENT
Start: 2022-03-07 | End: 2022-09-12 | Stop reason: SDUPTHER

## 2022-03-07 RX ORDER — CARVEDILOL 3.12 MG/1
3.12 TABLET ORAL EVERY EVENING
Qty: 90 TABLET | Refills: 3 | Status: SHIPPED | OUTPATIENT
Start: 2022-03-07 | End: 2022-09-12 | Stop reason: SDUPTHER

## 2022-03-07 RX ORDER — EZETIMIBE 10 MG/1
10 TABLET ORAL DAILY
Qty: 90 TABLET | Refills: 3 | Status: SHIPPED | OUTPATIENT
Start: 2022-03-07 | End: 2022-09-12 | Stop reason: SDUPTHER

## 2022-03-07 RX ORDER — ROSUVASTATIN CALCIUM 40 MG/1
40 TABLET, COATED ORAL DAILY
Qty: 90 TABLET | Refills: 0 | Status: SHIPPED | OUTPATIENT
Start: 2022-03-07 | End: 2022-06-08

## 2022-03-07 NOTE — ASSESSMENT & PLAN NOTE
His LDL has gotten worse over the 6 months.  It is running 78 now.  Going to add Zetia to his 40 mg of Crestor.  He will get him chemistry panel and lipid panel in 3 months and office visit with blood work in 6 months.  He has been strongly urged to follow a low-cholesterol diet and on inquiry his diet seems to be really good

## 2022-03-07 NOTE — PROGRESS NOTES
New Patient Office Visit    Chief Complaint  Follow-up (With EKG) and Hypertension (Pt has BP log with him today)    Subjective            Derek Castelan presents to [facility] CARDIOLOGY  Mr. Castelan is a 76 years old gentleman with coronary disease previous bypass surgery subsequent occlusion of grafts with thrombus even when on Plavix.  Is done very well on Brilinta is going to be on long-term Brilinta therapy with 60 mg twice daily.  He denies any episodes of chest shortness of breath palpitations dizziness or syncope      Past Medical History:   Diagnosis Date   • Bladder mass 04/08/2020   • Calculus of kidney    • Cancer (HCC)     Bladder Cancer   • Coronary artery disease    • Coronary artery disease involving coronary bypass graft of native heart without angina pectoris 7/22/2021    with previous bypass surgery (July 2007 x6, LIMA to diagonal 1 and the LAD, SVG to OM1 and OM2, and SVG to sequential graft to the RCA and PDA); Previous myocardial infarction (even when on clopidogrel   • Essential hypertension 7/22/2021    Blood pressure is up but this is his first visit here.  He has appointment tomorrow with Dr. Chao so will defer any changes to him.   • Hematuria 04/08/2020   • High cholesterol    • Hypertension    • Hypothyroidism    • Mixed hyperlipidemia 8/1/2021    LDL was 70 in July 2021.  Patient is maintained on high-dose Crestor 40 mg, he is tolerating this, benefiting from it, and should continue same.       No Known Allergies     Past Surgical History:   Procedure Laterality Date   • BLADDER TUMOR EXCISION     • CARDIAC SURGERY  2007    open heart   • HERNIA REPAIR  2001   • SKIN CANCER EXCISION      bascal cell        Social History     Tobacco Use   • Smoking status: Never Smoker   • Smokeless tobacco: Never Used   Vaping Use   • Vaping Use: Never used   Substance Use Topics   • Alcohol use: Never   • Drug use: Never       Family History   Problem Relation Age of Onset   • Heart  "disease Mother    • Diabetes Mother    • Hypertension Mother    • Cancer Sister    • Heart disease Brother         Prior to Admission medications    Medication Sig Start Date End Date Taking? Authorizing Provider   aspirin 81 MG EC tablet aspirin 81 mg oral tablet,delayed release (DR/EC) take 1 tablet (81 mg) by oral route once daily   Active   Yes Lisa Monsalve MD   Brilinta 60 MG tablet tablet Take 1 tablet by mouth 2 (Two) Times a Day. 12/23/21  Yes Soraida Chao MD   carvedilol (COREG) 3.125 MG tablet Take 1 tablet by mouth Every Evening. 8/4/21  Yes Soraida Chao MD   docusate sodium (Stool Softener) 100 MG capsule Take 100 mg by mouth 2 (Two) Times a Day.   Yes Lisa Monsalve MD   ferrous sulfate 325 (65 FE) MG tablet Take 1 tablet by mouth Every Other Day. 8/3/21  Yes Richard Millan MD   levothyroxine (SYNTHROID, LEVOTHROID) 88 MCG tablet Take 1 tablet by mouth Daily. 8/4/21  Yes Soraida Chao MD   nitroglycerin (NITRODUR) 0.2 MG/HR patch APPLY IN THE MORNING AND REMOVE IN THE IN THE EVENING 1/19/22  Yes Jeni Velazquez WilmaANDI   nitroglycerin (NITROSTAT) 0.4 MG SL tablet Place 1 tablet under the tongue Every 5 (Five) Minutes As Needed for Chest Pain. Take no more than 3 doses in 15 minutes. 8/4/21  Yes Soraida Chao MD   rosuvastatin (CRESTOR) 40 MG tablet Take 40 mg by mouth Every Evening. 5/20/21  Yes Lisa Monsalve MD        Review of Systems   Constitutional: Negative for fatigue.   Respiratory: Negative for cough and shortness of breath.    Cardiovascular: Negative for chest pain, palpitations and leg swelling.   Neurological: Negative for dizziness.        Objective     /74 (BP Location: Left arm, Patient Position: Sitting, Cuff Size: Adult)   Pulse 72   Ht 177.8 cm (70\")   Wt 87.1 kg (192 lb)   BMI 27.55 kg/m²       Physical Exam  Constitutional:       General: He is awake.      Appearance: Normal appearance.   Neck:      Thyroid: No thyromegaly.      Vascular: " No carotid bruit or JVD.   Cardiovascular:      Rate and Rhythm: Normal rate and regular rhythm.      Chest Wall: PMI is not displaced.      Pulses: Normal pulses.      Heart sounds: Normal heart sounds, S1 normal and S2 normal. No murmur heard.    No friction rub. No gallop. No S3 or S4 sounds.   Pulmonary:      Effort: Pulmonary effort is normal.      Breath sounds: Normal breath sounds and air entry. No wheezing, rhonchi or rales.   Abdominal:      General: Bowel sounds are normal.      Palpations: Abdomen is soft. There is no mass.      Tenderness: There is no abdominal tenderness.   Musculoskeletal:      Cervical back: Neck supple.      Right lower leg: No edema.      Left lower leg: No edema.   Neurological:      Mental Status: He is alert and oriented to person, place, and time.   Psychiatric:         Mood and Affect: Mood normal.         Behavior: Behavior is cooperative.           Result Review :                    ECG 12 Lead    Date/Time: 3/7/2022 11:43 AM  Performed by: Soraida Chao MD  Authorized by: Soraida Chao MD   Comments: Sinus rhythm right axis old inferior MI age-indeterminate, incomplete right bundle branch block.  The QRS duration is slightly longer compared to EKG from August 2021           No results found for: PROBNP, BNP  CMP    CMP 7/30/21 3/1/22   Glucose 90 89   BUN 17 20   Creatinine 1.21 1.15   eGFR Non African Am 59 (A)    Sodium 139 140   Potassium 4.1 4.1   Chloride 107 104   Calcium 8.7 9.2   Albumin 4.00 4.40   Total Bilirubin 0.4 0.5   Alkaline Phosphatase 76 86   AST (SGOT) 42 (A) 42 (A)   ALT (SGPT) 31 40   (A) Abnormal value            CBC w/diff    CBC w/Diff 7/30/21 3/1/22   WBC 4.40 11.88 (A)   RBC 4.10 (A) 4.44   Hemoglobin 11.9 (A) 14.1   Hematocrit 36.3 (A) 41.4   MCV 88.5 93.2   MCH 29.0 31.8   MCHC 32.8 34.1   RDW 14.3 12.7   Platelets 170 178   Neutrophil Rel % 48.2 76.1 (A)   Immature Granulocyte Rel % 0.2 0.3   Lymphocyte Rel % 37.5 15.9 (A)   Monocyte Rel %  10.0 5.9   Eosinophil Rel % 3.2 1.5   Basophil Rel % 0.9 0.3   (A) Abnormal value             Lipid Panel    Lipid Panel 7/30/21 3/1/22   Total Cholesterol 137 148   Triglycerides 56 94   HDL Cholesterol 55 55   VLDL Cholesterol 12 17   LDL Cholesterol  70 76   LDL/HDL Ratio 1.29 1.35            Lab Results   Component Value Date    TSH 1.990 03/01/2022    TSH 2.680 03/09/2020    TSH 1.320 08/29/2019      Lab Results   Component Value Date    FREET4 1.34 03/01/2022    FREET4 1.3 03/09/2020    FREET4 1.5 08/29/2019      No results found for: DDIMERQUANT  Magnesium   Date Value Ref Range Status   03/01/2022 1.7 1.6 - 2.4 mg/dL Final      No results found for: DIGOXIN               Assessment and Plan        Diagnoses and all orders for this visit:    1. Coronary artery disease involving coronary bypass graft of native heart without angina pectoris (Primary)  Assessment & Plan:  Mr. Castelan is a 76 years old gentleman with coronary disease previous bypass surgery subsequent occlusion of grafts with thrombus even when on Plavix.  Is done very well on Brilinta is going to be on long-term Brilinta therapy with 60 mg twice daily.  He denies any episodes of chest shortness of breath palpitations dizziness or syncope    Orders:  -     Lipid Panel; Future  -     Comprehensive Metabolic Panel; Future  -     Magnesium; Future  -     Lipid Panel; Future  -     Comprehensive Metabolic Panel; Future  -     Magnesium; Future  -     ECG 12 Lead    2. Essential hypertension  -     Lipid Panel; Future  -     Comprehensive Metabolic Panel; Future  -     Magnesium; Future  -     Lipid Panel; Future  -     Comprehensive Metabolic Panel; Future  -     Magnesium; Future  -     ECG 12 Lead    3. Mixed hyperlipidemia  Assessment & Plan:  His LDL has gotten worse over the 6 months.  It is running 78 now.  Going to add Zetia to his 40 mg of Crestor.  He will get him chemistry panel and lipid panel in 3 months and office visit with blood work in  6 months.  He has been strongly urged to follow a low-cholesterol diet and on inquiry his diet seems to be really good    Orders:  -     Lipid Panel; Future  -     Comprehensive Metabolic Panel; Future  -     Magnesium; Future  -     Lipid Panel; Future  -     Comprehensive Metabolic Panel; Future  -     Magnesium; Future  -     ECG 12 Lead    4. S/P CABG x 6    Other orders  -     Brilinta 60 MG tablet tablet; Take 1 tablet by mouth 2 (Two) Times a Day.  Dispense: 180 tablet; Refill: 3  -     carvedilol (COREG) 3.125 MG tablet; Take 1 tablet by mouth Every Evening.  Dispense: 90 tablet; Refill: 3  -     levothyroxine (SYNTHROID, LEVOTHROID) 88 MCG tablet; Take 1 tablet by mouth Daily.  Dispense: 90 tablet; Refill: 3  -     rosuvastatin (CRESTOR) 40 MG tablet; Take 1 tablet by mouth Daily.  Dispense: 90 tablet; Refill: 0  -     ezetimibe (ZETIA) 10 MG tablet; Take 1 tablet by mouth Daily.  Dispense: 90 tablet; Refill: 3    He has brought his home blood pressure log and that looks excellent.  His blood pressure is well controlled.  He will continue the rest of his medication and follow-up in 6 months or earlier if necessary      Follow Up     Return in about 6 months (around 9/7/2022) for next ov with June.    Patient was given instructions and counseling regarding his condition or for health maintenance advice. Please see specific information pulled into the AVS if appropriate.     Soraida Chao MD  03/07/22 10:40 EST

## 2022-03-07 NOTE — ASSESSMENT & PLAN NOTE
Mr. Castelan is a 76 years old gentleman with coronary disease previous bypass surgery subsequent occlusion of grafts with thrombus even when on Plavix.  Is done very well on Brilinta is going to be on long-term Brilinta therapy with 60 mg twice daily.  He denies any episodes of chest shortness of breath palpitations dizziness or syncope

## 2022-03-16 RX ORDER — FERROUS SULFATE 325(65) MG
TABLET ORAL
Qty: 45 TABLET | Refills: 1 | Status: SHIPPED | OUTPATIENT
Start: 2022-03-16 | End: 2022-10-06

## 2022-03-21 ENCOUNTER — TELEPHONE (OUTPATIENT)
Dept: CARDIOLOGY | Facility: CLINIC | Age: 77
End: 2022-03-21

## 2022-04-11 ENCOUNTER — OFFICE VISIT (OUTPATIENT)
Dept: INTERNAL MEDICINE | Facility: CLINIC | Age: 77
End: 2022-04-11

## 2022-04-11 VITALS
BODY MASS INDEX: 28.66 KG/M2 | HEART RATE: 59 BPM | DIASTOLIC BLOOD PRESSURE: 93 MMHG | OXYGEN SATURATION: 98 % | TEMPERATURE: 98.2 F | SYSTOLIC BLOOD PRESSURE: 157 MMHG | HEIGHT: 70 IN | WEIGHT: 200.2 LBS

## 2022-04-11 DIAGNOSIS — E03.4 ATROPHY OF THYROID: ICD-10-CM

## 2022-04-11 DIAGNOSIS — I10 ESSENTIAL HYPERTENSION: Primary | Chronic | ICD-10-CM

## 2022-04-11 DIAGNOSIS — E78.2 MIXED HYPERLIPIDEMIA: Chronic | ICD-10-CM

## 2022-04-11 DIAGNOSIS — R06.09 DYSPNEA ON EXERTION: ICD-10-CM

## 2022-04-11 DIAGNOSIS — E53.8 VITAMIN B12 DEFICIENCY: ICD-10-CM

## 2022-04-11 DIAGNOSIS — D50.0 IRON DEFICIENCY ANEMIA DUE TO CHRONIC BLOOD LOSS: ICD-10-CM

## 2022-04-11 DIAGNOSIS — I25.810 CORONARY ARTERY DISEASE INVOLVING CORONARY BYPASS GRAFT OF NATIVE HEART WITHOUT ANGINA PECTORIS: Chronic | ICD-10-CM

## 2022-04-11 PROCEDURE — 99214 OFFICE O/P EST MOD 30 MIN: CPT | Performed by: INTERNAL MEDICINE

## 2022-04-11 NOTE — ASSESSMENT & PLAN NOTE
Hemoglobin was 12.3 in March 2020 when he was seen in the ER for hematuria.  Presently it is 11.9.  We need to get iron studies and B12 and follow them up in a few months.  We will start every other day a day iron until then.---> Hemoglobin is up to 14.1 as of 4/22 office visit.  His iron is only 73 for a 17% saturation, so he should continue low-dose over-the-counter iron therapy every other day.

## 2022-04-11 NOTE — ASSESSMENT & PLAN NOTE
Blood pressure is up but this is his first visit here.  He has appointment tomorrow with Dr. Chao so will defer any changes to him.---> Blood pressure certainly not optimal here in the office again as of 4/22, however patient just saw Dr. Chao last month, blood pressure was fine there, and also he has excellent blood pressures based on his home blood pressure log.  Will defer readings to the once he gets at home, patient to call if they trend up consistently.  Otherwise stable to continue low-dose carvedilol only.

## 2022-04-11 NOTE — ASSESSMENT & PLAN NOTE
As noted last month for Dr. Chao, patient very stable this regards.  He is maintained on Brilinta, baby aspirin, Nitro-Dur, as well as carvedilol for antianginal benefits.  Continue same.

## 2022-04-11 NOTE — PATIENT INSTRUCTIONS
1.  Continue taking the iron pills, about every other day on average.    2.  Get Vitamin B12 500 mcg over-the-counter and take this daily.  3.  If all you can find is 1000 mcg vitamin B12, then obviously just take it every other day.

## 2022-04-11 NOTE — ASSESSMENT & PLAN NOTE
TSH was well normal as of his 4/22 office visit.  Patient stable to continue with 88 mcg daily going forward.

## 2022-04-11 NOTE — ASSESSMENT & PLAN NOTE
This is mild as of 4/22 office visit, level is 197, but this should be treated to prevent further complications.  Discussed with patient what to get over-the-counter, will repeat level on return to office in about 6 months.

## 2022-04-11 NOTE — ASSESSMENT & PLAN NOTE
LDL was 70 in July 2021.  Patient is maintained on high-dose Crestor 40 mg, he is tolerating this, benefiting from it, and should continue same.---> LDL was 78 last month when seen by Dr. Chao.  Given his history of recurrent thrombosis, patient had Zetia added to his regimen.  Follow-up labs as scheduled.  Otherwise continue max dose Crestor.

## 2022-04-11 NOTE — PROGRESS NOTES
"Chief Complaint  Hypertension (Pt is here for routine follow up. Pt spouse wants him to get his ears checked out.)    Subjective          Derek Castelan presents to [facility] INTER[facility] MEDICINE     History of Present Illness  76-year-old male with underlying hypertension, hyperlipidemia, resultant coronary artery disease status post 6 vessel bypass in 2007, who was seen as a new pt 8/21 and who is coming in now 4/22 for routine 6-8-month f/u.  We review any recent labs he may have had and address any new concerns.  Of note he is followed closely by Dr. Chao's group in regards to his heart disease.    Review of Systems   Constitutional: Positive for fatigue. Negative for appetite change and fever.   HENT: Negative for congestion and ear pain.    Eyes: Negative for blurred vision.   Respiratory: Negative for cough, chest tightness, shortness of breath and wheezing.    Cardiovascular: Negative for chest pain, palpitations and leg swelling.   Gastrointestinal: Negative for abdominal pain.   Genitourinary: Negative for difficulty urinating, dysuria and hematuria.   Musculoskeletal: Negative for arthralgias and gait problem.   Skin: Negative for skin lesions.   Neurological: Negative for syncope, memory problem and confusion.   Hematological: Bruises/bleeds easily.   Psychiatric/Behavioral: Negative for self-injury and depressed mood.       Objective   Vital Signs:   /93   Pulse 59   Temp 98.2 °F (36.8 °C)   Ht 177.8 cm (70\")   Wt 90.8 kg (200 lb 3.2 oz)   SpO2 98%   BMI 28.73 kg/m²           Physical Exam  Vitals and nursing note reviewed.   Constitutional:       General: He is not in acute distress.     Appearance: Normal appearance. He is not toxic-appearing.   HENT:      Head: Atraumatic.      Right Ear: External ear normal.      Left Ear: External ear normal.      Nose: Nose normal.      Mouth/Throat:      Mouth: Mucous membranes are moist.   Eyes:      General:         Right eye: No " discharge.         Left eye: No discharge.      Extraocular Movements: Extraocular movements intact.      Pupils: Pupils are equal, round, and reactive to light.   Cardiovascular:      Rate and Rhythm: Normal rate and regular rhythm.      Pulses: Normal pulses.      Heart sounds: Normal heart sounds. No murmur heard.    No gallop.   Pulmonary:      Effort: Pulmonary effort is normal. No respiratory distress.      Breath sounds: No wheezing, rhonchi or rales.   Abdominal:      General: There is no distension.      Palpations: Abdomen is soft. There is no mass.      Tenderness: There is no abdominal tenderness. There is no guarding.   Musculoskeletal:         General: No swelling or tenderness.      Cervical back: No tenderness.      Right lower leg: No edema.      Left lower leg: No edema.   Skin:     General: Skin is warm and dry.      Findings: Bruising present. No rash.   Neurological:      General: No focal deficit present.      Mental Status: He is alert and oriented to person, place, and time. Mental status is at baseline.      Motor: No weakness.      Gait: Gait normal.   Psychiatric:         Mood and Affect: Mood normal.         Thought Content: Thought content normal.          Result Review :   The following data was reviewed by: Richard Millan MD on 08/03/2021:                  Assessment and Plan    Diagnoses and all orders for this visit:    1. Essential hypertension (Primary)  Assessment & Plan:  Blood pressure is up but this is his first visit here.  He has appointment tomorrow with Dr. Chao so will defer any changes to him.---> Blood pressure certainly not optimal here in the office again as of 4/22, however patient just saw Dr. Chao last month, blood pressure was fine there, and also he has excellent blood pressures based on his home blood pressure log.  Will defer readings to the once he gets at home, patient to call if they trend up consistently.  Otherwise stable to continue low-dose carvedilol  only.      2. Mixed hyperlipidemia  Assessment & Plan:  LDL was 70 in July 2021.  Patient is maintained on high-dose Crestor 40 mg, he is tolerating this, benefiting from it, and should continue same.---> LDL was 78 last month when seen by Dr. Chao.  Given his history of recurrent thrombosis, patient had Zetia added to his regimen.  Follow-up labs as scheduled.  Otherwise continue max dose Crestor.        3. Atrophy of thyroid  Assessment & Plan:  TSH was well normal as of his 4/22 office visit.  Patient stable to continue with 88 mcg daily going forward.      Orders:  -     TSH; Future  -     T4, free; Future    4. Coronary artery disease involving coronary bypass graft of native heart without angina pectoris  Overview:  Previous bypass surgery (July 2007 x6, LIMA to diagonal 1 and the LAD, SVG to OM1 and OM2, and SVG to sequential graft to the RCA and PDA); Previous myocardial infarction (even when on clopidogrel)    SPECT 11/21:  · Left ventricular ejection fraction is mildly reduced. (Calculated EF = 41%).  · Findings consistent with a normal ECG stress test.  · Myocardial perfusion imaging indicates a medium-sized infarct located in the basal inferior lateral wall with mild amara-infarct ischemia.  · Impressions are consistent with an intermediate risk study.  · The current study reveals no changes compared to prior study of 2018        Assessment & Plan:  As noted last month for Dr. Chao, patient very stable this regards.  He is maintained on Brilinta, baby aspirin, Nitro-Dur, as well as carvedilol for antianginal benefits.  Continue same.      5. Vitamin B12 deficiency  Assessment & Plan:  This is mild as of 4/22 office visit, level is 197, but this should be treated to prevent further complications.  Discussed with patient what to get over-the-counter, will repeat level on return to office in about 6 months.    Orders:  -     Vitamin B12 anemia; Future  -     Folate anemia; Future    6. Iron deficiency  anemia due to chronic blood loss  Assessment & Plan:  Hemoglobin was 12.3 in March 2020 when he was seen in the ER for hematuria.  Presently it is 11.9.  We need to get iron studies and B12 and follow them up in a few months.  We will start every other day a day iron until then.---> Hemoglobin is up to 14.1 as of 4/22 office visit.  His iron is only 73 for a 17% saturation, so he should continue low-dose over-the-counter iron therapy every other day.      Orders:  -     CBC & Differential; Future  -     Iron Profile; Future  -     Ferritin; Future    7. Dyspnea on exertion  -     BNP; Future         CARDIOLOGY OV 1/4/21:    Derek Castelan is a 75 year old /White male who denies any chest pain or pressure. No palpitations, shortness of breath, swelling, dizziness, syncope, PND, or orthopnea. Cardiac-wise, he is feeling very well, but he has gained 8 pounds since his last visit. He has had bladder cancer and surgery since he was last here, but no chemotherapy. His blood pressures are monitored at home, but he did not bring his readings and says they are in good range. 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   PAST MEDICAL HISTORY: Coronary artery disease with previous bypass surgery (July 2007 x6, LIMA to diagonal 1 and the LAD, SVG to OM1 and OM2, and SVG to sequential graft to the RCA and PDA); Previous myocardial infarction (even when on clopidogrel); Hyperlipidemia; Hypertension; Hypothyroidism.   PSYCHOSOCIAL HISTORY: Denies tobacco use. Denies alcohol use.   CURRENT MEDICATIONS: Brilinta 60 mg b.i.d. long-term; carvedilol 3.125 mg q. p.m.; levothyroxine 88 mcg q. a.m.; rosuvastatin 40 mg q. p.m.; NitroPatch 0.2 mg/hour; aspirin 81 mg daily; nitroglycerin 0.4 mg p.r.n.; Centrum Silver.       ALLERGIES:  No known drug allergies.     1.  Hypertension, uncertain control.  2.  Coronary artery disease with previous MI and bypass without angina.  3.   Hyperlipidemia, at goal.        Plan      1.  Do a blood pressure log, and we will adjust hypertensive medications if needed.    2.  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    3.  Continue Brilinta long-term.  4.  Continue carvedilol for his hypertension.  5.  Continue rosuvastatin for his cholesterols.  6.  Follow up in 6 months with labs and an EKG or earlier if needed.          UROLOGY 5/7/2020:    High Grade TI urothelial cancer. I have discussed the management of high grade T1 urothelial cancer of the bladder with the patient, including a 25-40% likelihood of detecting T2 or greater disease at re-resection. I have discussed the risks and prognosis of High grade TI disease with the patient including progression to MIBC and recurrence of HGTI. I have discussed bladder preserving management with BCG and intravesical agents, and the risks of infection, bladder screening, recurrence and progression, as well as the need for maintenance with BCG. I have discussed the role of early cystectomy for patients with HGTI and the excellent overall survival for patient with HGT1 bladder cancer who have a cystectomy. I mentioned that even with HGT1 there is a 10-15% chance of being discovered to have salome metastasis.      Recent pathology was HGT1 and discussed with patient that guidelines dictate re-resection in about 6 weeks, ensuring adequate healing prior- aware that Brillinta will have to be held again.  Will plan for repeat bilateral retrogrades at that time given history of bilateral hydronephrosis at time of hematuria.   Given the above limitations visually at time of resection as above and per operative note, will discuss with pathology if frozen sections at time of re-resection is possible if needed     Follow Up   Return in about 5 months (around 9/11/2022).  Patient was given instructions and counseling regarding his condition or for health maintenance advice. Please  see specific information pulled into the AVS if appropriate.

## 2022-06-08 RX ORDER — ROSUVASTATIN CALCIUM 40 MG/1
TABLET, COATED ORAL
Qty: 90 TABLET | Refills: 2 | Status: SHIPPED | OUTPATIENT
Start: 2022-06-08 | End: 2023-03-09

## 2022-06-15 ENCOUNTER — LAB (OUTPATIENT)
Dept: LAB | Facility: HOSPITAL | Age: 77
End: 2022-06-15

## 2022-06-15 DIAGNOSIS — I25.810 CORONARY ARTERY DISEASE INVOLVING CORONARY BYPASS GRAFT OF NATIVE HEART WITHOUT ANGINA PECTORIS: Chronic | ICD-10-CM

## 2022-06-15 DIAGNOSIS — E78.2 MIXED HYPERLIPIDEMIA: Chronic | ICD-10-CM

## 2022-06-15 DIAGNOSIS — I10 ESSENTIAL HYPERTENSION: Chronic | ICD-10-CM

## 2022-06-15 LAB
ALBUMIN SERPL-MCNC: 4.5 G/DL (ref 3.5–5.2)
ALBUMIN/GLOB SERPL: 2 G/DL
ALP SERPL-CCNC: 70 U/L (ref 39–117)
ALT SERPL W P-5'-P-CCNC: 44 U/L (ref 1–41)
ANION GAP SERPL CALCULATED.3IONS-SCNC: 12.3 MMOL/L (ref 5–15)
AST SERPL-CCNC: 54 U/L (ref 1–40)
BILIRUB SERPL-MCNC: 0.6 MG/DL (ref 0–1.2)
BUN SERPL-MCNC: 19 MG/DL (ref 8–23)
BUN/CREAT SERPL: 17 (ref 7–25)
CALCIUM SPEC-SCNC: 9.2 MG/DL (ref 8.6–10.5)
CHLORIDE SERPL-SCNC: 106 MMOL/L (ref 98–107)
CHOLEST SERPL-MCNC: 120 MG/DL (ref 0–200)
CO2 SERPL-SCNC: 19.7 MMOL/L (ref 22–29)
CREAT SERPL-MCNC: 1.12 MG/DL (ref 0.76–1.27)
EGFRCR SERPLBLD CKD-EPI 2021: 68.5 ML/MIN/1.73
GLOBULIN UR ELPH-MCNC: 2.2 GM/DL
GLUCOSE SERPL-MCNC: 91 MG/DL (ref 65–99)
HDLC SERPL-MCNC: 49 MG/DL (ref 40–60)
LDLC SERPL CALC-MCNC: 57 MG/DL (ref 0–100)
LDLC/HDLC SERPL: 1.19 {RATIO}
MAGNESIUM SERPL-MCNC: 1.9 MG/DL (ref 1.6–2.4)
POTASSIUM SERPL-SCNC: 4.4 MMOL/L (ref 3.5–5.2)
PROT SERPL-MCNC: 6.7 G/DL (ref 6–8.5)
SODIUM SERPL-SCNC: 138 MMOL/L (ref 136–145)
TRIGL SERPL-MCNC: 64 MG/DL (ref 0–150)
VLDLC SERPL-MCNC: 14 MG/DL (ref 5–40)

## 2022-06-15 PROCEDURE — 83735 ASSAY OF MAGNESIUM: CPT

## 2022-06-15 PROCEDURE — 80053 COMPREHEN METABOLIC PANEL: CPT

## 2022-06-15 PROCEDURE — 80061 LIPID PANEL: CPT

## 2022-06-15 PROCEDURE — 36415 COLL VENOUS BLD VENIPUNCTURE: CPT

## 2022-06-22 ENCOUNTER — TELEPHONE (OUTPATIENT)
Dept: CARDIOLOGY | Facility: CLINIC | Age: 77
End: 2022-06-22

## 2022-06-22 NOTE — TELEPHONE ENCOUNTER
----- Message from Soraida Chao MD sent at 6/17/2022  4:15 PM EDT -----  Cholesterol looks a lot better.  Liver function of just a little bit.  Avoid all alcoholic beverages.  Please check CMP in 6 to 8 weeks

## 2022-06-23 NOTE — TELEPHONE ENCOUNTER
Hold medicine for 2 weeks to see if his fatigue changes and then restart medication.  Please let us know what happens

## 2022-06-23 NOTE — TELEPHONE ENCOUNTER
Pt wife called and left a message stating that Mr. Castelan has been feeling more tired than normal.  She thinks that it is due to ezetimibe.  She wanted to know if there was anything else that he could try.  Please advise.

## 2022-08-26 ENCOUNTER — TELEMEDICINE (OUTPATIENT)
Dept: INTERNAL MEDICINE | Facility: CLINIC | Age: 77
End: 2022-08-26

## 2022-08-26 ENCOUNTER — TELEPHONE (OUTPATIENT)
Dept: INTERNAL MEDICINE | Facility: CLINIC | Age: 77
End: 2022-08-26

## 2022-08-26 DIAGNOSIS — U07.1 COVID-19: Primary | ICD-10-CM

## 2022-08-26 PROCEDURE — 99213 OFFICE O/P EST LOW 20 MIN: CPT

## 2022-08-26 RX ORDER — DEXAMETHASONE 6 MG/1
6 TABLET ORAL
Qty: 10 TABLET | Refills: 0 | Status: SHIPPED | OUTPATIENT
Start: 2022-08-26 | End: 2022-09-12

## 2022-08-26 RX ORDER — AZITHROMYCIN 250 MG/1
TABLET, FILM COATED ORAL
Qty: 6 TABLET | Refills: 0 | Status: SHIPPED | OUTPATIENT
Start: 2022-08-26 | End: 2022-09-12

## 2022-08-26 NOTE — TELEPHONE ENCOUNTER
Caller: VIKTOR ROD    Relationship: Emergency Contact    Best call back number: [phone]    What is the best time to reach you: ANY    Who are you requesting to speak with (clinical staff, provider,  specific staff member): CLINICAL STAFF    What was the call regarding: PATIENTS WIFE CALLED STATING THAT THE PATIENT HAS TESTED POSITIVE FOR COVID AND SHE WOULD LIKE TO KNOW WHAT TO DO.    Do you require a callback: YES

## 2022-08-26 NOTE — PROGRESS NOTES
Mode of Visit: Video  Location of patient: home  You have chosen to receive care through a telehealth visit.  Does the patient consent to use a video/audio connection for your medical care today? Yes  The visit included audio and video interaction. No technical issues occurred during this visit.     Chief Complaint  Covid-19 Home Monitoring Video Visit (Patient tested positive for Covid yesterday. Having symptoms of fatigue, fever, ear pain, cough, and congestion. Started 2 days ago.)    Subjective          Derek Castelan presents to [facility]  History of Present Illness   With complaints of fatigue, ear pain, cough, fever, and congestion. Symptoms started 2 days ago.   Pt denies vomiting, diarrhea, SOA, or wheezing.  Pt tested positive for COVID-19 last night.     Pt is taking tylenol and robitussin OTC.    Objective   Vital Signs:   There were no vitals taken for this visit.    Physical Exam   Constitutional: He appears well-developed and well-nourished.   HENT:   Head: Normocephalic.   Eyes: Conjunctivae and EOM are normal.   Neck: Neck normal appearance.  Pulmonary/Chest: Effort normal.  No respiratory distress. He no audible wheeze...  Neurological: He is alert.   Skin: Skin is warm and dry.   Psychiatric: He has a normal mood and affect.     Result Review :                 Assessment and Plan    Diagnoses and all orders for this visit:    1. COVID-19 (Primary)  Assessment & Plan:  Pt complaints of fatigue, ear pain, cough, fever, and congestion. Symptoms started 2 days ago.   Pt denies vomiting, diarrhea, SOA, or wheezing.  Pt tested positive for COVID-19 last night.   Pt is taking tylenol and robitussin OTC.  Discussed possible paxlovid and molnupiravir. There were too many interactions with paxlovid and his other medications. Discussed options with pt wife. She states he had COVID last year and did much better with steroids. Would prefer that over antivirals at this  time.  Plan: Start decadron 6 mg daily. May take tylenol and mucinex as well.  Pt to monitor for any worsening symptoms and seek medical attn with any SOA, wheezing, or uncontrolled fever.          Other orders  -     dexamethasone (DECADRON) 6 MG tablet; Take 1 tablet by mouth Daily With Breakfast.  Dispense: 10 tablet; Refill: 0  -     azithromycin (Zithromax Z-Suraj) 250 MG tablet; Take 2 tablets by mouth on day 1, then 1 tablet daily on days 2-5  Dispense: 6 tablet; Refill: 0      Follow Up   Return if symptoms worsen or fail to improve.  Patient was given instructions and counseling regarding his condition or for health maintenance advice. Please see specific information pulled into the AVS if appropriate.

## 2022-08-26 NOTE — ASSESSMENT & PLAN NOTE
Pt complaints of fatigue, ear pain, cough, fever, and congestion. Symptoms started 2 days ago.   Pt denies vomiting, diarrhea, SOA, or wheezing.  Pt tested positive for COVID-19 last night.   Pt is taking tylenol and robitussin OTC.  Discussed possible paxlovid and molnupiravir. There were too many interactions with paxlovid and his other medications. Discussed options with pt wife. She states he had COVID last year and did much better with steroids. Would prefer that over antivirals at this time.  Plan: Start decadron 6 mg daily. May take tylenol and mucinex as well.  Pt to monitor for any worsening symptoms and seek medical attn with any SOA, wheezing, or uncontrolled fever.

## 2022-09-08 ENCOUNTER — LAB (OUTPATIENT)
Dept: LAB | Facility: HOSPITAL | Age: 77
End: 2022-09-08

## 2022-09-08 DIAGNOSIS — E53.8 VITAMIN B12 DEFICIENCY: ICD-10-CM

## 2022-09-08 DIAGNOSIS — E78.2 MIXED HYPERLIPIDEMIA: Chronic | ICD-10-CM

## 2022-09-08 DIAGNOSIS — E03.4 ATROPHY OF THYROID: ICD-10-CM

## 2022-09-08 DIAGNOSIS — R06.09 DYSPNEA ON EXERTION: ICD-10-CM

## 2022-09-08 DIAGNOSIS — I25.810 CORONARY ARTERY DISEASE INVOLVING CORONARY BYPASS GRAFT OF NATIVE HEART WITHOUT ANGINA PECTORIS: Chronic | ICD-10-CM

## 2022-09-08 DIAGNOSIS — I10 ESSENTIAL HYPERTENSION: Chronic | ICD-10-CM

## 2022-09-08 DIAGNOSIS — D50.0 IRON DEFICIENCY ANEMIA DUE TO CHRONIC BLOOD LOSS: ICD-10-CM

## 2022-09-08 LAB
ALBUMIN SERPL-MCNC: 3.9 G/DL (ref 3.5–5.2)
ALBUMIN/GLOB SERPL: 1.4 G/DL
ALP SERPL-CCNC: 68 U/L (ref 39–117)
ALT SERPL W P-5'-P-CCNC: 38 U/L (ref 1–41)
ANION GAP SERPL CALCULATED.3IONS-SCNC: 8.9 MMOL/L (ref 5–15)
AST SERPL-CCNC: 40 U/L (ref 1–40)
BASOPHILS # BLD AUTO: 0.02 10*3/MM3 (ref 0–0.2)
BASOPHILS NFR BLD AUTO: 0.2 % (ref 0–1.5)
BILIRUB SERPL-MCNC: 0.6 MG/DL (ref 0–1.2)
BUN SERPL-MCNC: 24 MG/DL (ref 8–23)
BUN/CREAT SERPL: 20.7 (ref 7–25)
CALCIUM SPEC-SCNC: 9.3 MG/DL (ref 8.6–10.5)
CHLORIDE SERPL-SCNC: 102 MMOL/L (ref 98–107)
CHOLEST SERPL-MCNC: 148 MG/DL (ref 0–200)
CO2 SERPL-SCNC: 26.1 MMOL/L (ref 22–29)
CREAT SERPL-MCNC: 1.16 MG/DL (ref 0.76–1.27)
DEPRECATED RDW RBC AUTO: 46.7 FL (ref 37–54)
EGFRCR SERPLBLD CKD-EPI 2021: 65.7 ML/MIN/1.73
EOSINOPHIL # BLD AUTO: 0.16 10*3/MM3 (ref 0–0.4)
EOSINOPHIL NFR BLD AUTO: 1.9 % (ref 0.3–6.2)
ERYTHROCYTE [DISTWIDTH] IN BLOOD BY AUTOMATED COUNT: 13 % (ref 12.3–15.4)
FERRITIN SERPL-MCNC: 187 NG/ML (ref 30–400)
FOLATE SERPL-MCNC: >20 NG/ML (ref 4.78–24.2)
GLOBULIN UR ELPH-MCNC: 2.7 GM/DL
GLUCOSE SERPL-MCNC: 90 MG/DL (ref 65–99)
HCT VFR BLD AUTO: 39.8 % (ref 37.5–51)
HDLC SERPL-MCNC: 54 MG/DL (ref 40–60)
HGB BLD-MCNC: 12.9 G/DL (ref 13–17.7)
IMM GRANULOCYTES # BLD AUTO: 0.02 10*3/MM3 (ref 0–0.05)
IMM GRANULOCYTES NFR BLD AUTO: 0.2 % (ref 0–0.5)
IRON 24H UR-MRATE: 93 MCG/DL (ref 59–158)
IRON SATN MFR SERPL: 28 % (ref 20–50)
LDLC SERPL CALC-MCNC: 77 MG/DL (ref 0–100)
LDLC/HDLC SERPL: 1.41 {RATIO}
LYMPHOCYTES # BLD AUTO: 2.43 10*3/MM3 (ref 0.7–3.1)
LYMPHOCYTES NFR BLD AUTO: 29.3 % (ref 19.6–45.3)
MAGNESIUM SERPL-MCNC: 1.9 MG/DL (ref 1.6–2.4)
MCH RBC QN AUTO: 31.6 PG (ref 26.6–33)
MCHC RBC AUTO-ENTMCNC: 32.4 G/DL (ref 31.5–35.7)
MCV RBC AUTO: 97.5 FL (ref 79–97)
MONOCYTES # BLD AUTO: 0.62 10*3/MM3 (ref 0.1–0.9)
MONOCYTES NFR BLD AUTO: 7.5 % (ref 5–12)
NEUTROPHILS NFR BLD AUTO: 5.04 10*3/MM3 (ref 1.7–7)
NEUTROPHILS NFR BLD AUTO: 60.9 % (ref 42.7–76)
NRBC BLD AUTO-RTO: 0 /100 WBC (ref 0–0.2)
NT-PROBNP SERPL-MCNC: 344 PG/ML (ref 0–1800)
PLATELET # BLD AUTO: 187 10*3/MM3 (ref 140–450)
PMV BLD AUTO: 10.4 FL (ref 6–12)
POTASSIUM SERPL-SCNC: 4.2 MMOL/L (ref 3.5–5.2)
PROT SERPL-MCNC: 6.6 G/DL (ref 6–8.5)
RBC # BLD AUTO: 4.08 10*6/MM3 (ref 4.14–5.8)
SODIUM SERPL-SCNC: 137 MMOL/L (ref 136–145)
T4 FREE SERPL-MCNC: 1.35 NG/DL (ref 0.93–1.7)
TIBC SERPL-MCNC: 331 MCG/DL (ref 298–536)
TRANSFERRIN SERPL-MCNC: 222 MG/DL (ref 200–360)
TRIGL SERPL-MCNC: 88 MG/DL (ref 0–150)
TSH SERPL DL<=0.05 MIU/L-ACNC: 4.12 UIU/ML (ref 0.27–4.2)
VIT B12 BLD-MCNC: 466 PG/ML (ref 211–946)
VLDLC SERPL-MCNC: 17 MG/DL (ref 5–40)
WBC NRBC COR # BLD: 8.29 10*3/MM3 (ref 3.4–10.8)

## 2022-09-08 PROCEDURE — 82746 ASSAY OF FOLIC ACID SERUM: CPT

## 2022-09-08 PROCEDURE — 80061 LIPID PANEL: CPT

## 2022-09-08 PROCEDURE — 83735 ASSAY OF MAGNESIUM: CPT

## 2022-09-08 PROCEDURE — 83880 ASSAY OF NATRIURETIC PEPTIDE: CPT

## 2022-09-08 PROCEDURE — 82607 VITAMIN B-12: CPT

## 2022-09-08 PROCEDURE — 36415 COLL VENOUS BLD VENIPUNCTURE: CPT

## 2022-09-08 PROCEDURE — 80050 GENERAL HEALTH PANEL: CPT

## 2022-09-08 PROCEDURE — 83540 ASSAY OF IRON: CPT

## 2022-09-08 PROCEDURE — 84466 ASSAY OF TRANSFERRIN: CPT

## 2022-09-08 PROCEDURE — 84439 ASSAY OF FREE THYROXINE: CPT

## 2022-09-08 PROCEDURE — 82728 ASSAY OF FERRITIN: CPT

## 2022-09-12 ENCOUNTER — OFFICE VISIT (OUTPATIENT)
Dept: CARDIOLOGY | Facility: CLINIC | Age: 77
End: 2022-09-12

## 2022-09-12 VITALS
HEIGHT: 70 IN | DIASTOLIC BLOOD PRESSURE: 77 MMHG | BODY MASS INDEX: 26.83 KG/M2 | HEART RATE: 51 BPM | WEIGHT: 187.4 LBS | SYSTOLIC BLOOD PRESSURE: 131 MMHG

## 2022-09-12 DIAGNOSIS — Z95.1 S/P CABG X 6: ICD-10-CM

## 2022-09-12 DIAGNOSIS — I25.810 CORONARY ARTERY DISEASE INVOLVING CORONARY BYPASS GRAFT OF NATIVE HEART WITHOUT ANGINA PECTORIS: Primary | Chronic | ICD-10-CM

## 2022-09-12 DIAGNOSIS — I10 ESSENTIAL HYPERTENSION: Chronic | ICD-10-CM

## 2022-09-12 DIAGNOSIS — E78.2 MIXED HYPERLIPIDEMIA: Chronic | ICD-10-CM

## 2022-09-12 PROCEDURE — 99214 OFFICE O/P EST MOD 30 MIN: CPT | Performed by: INTERNAL MEDICINE

## 2022-09-12 RX ORDER — EZETIMIBE 10 MG/1
5 TABLET ORAL DAILY
Qty: 90 TABLET | Refills: 3 | Status: SHIPPED | OUTPATIENT
Start: 2022-09-12 | End: 2023-02-20

## 2022-09-12 RX ORDER — LEVOTHYROXINE SODIUM 88 UG/1
88 TABLET ORAL DAILY
Qty: 90 TABLET | Refills: 3 | Status: SHIPPED | OUTPATIENT
Start: 2022-09-12

## 2022-09-12 RX ORDER — CARVEDILOL 3.12 MG/1
3.12 TABLET ORAL EVERY EVENING
Qty: 90 TABLET | Refills: 3 | Status: SHIPPED | OUTPATIENT
Start: 2022-09-12

## 2022-09-12 NOTE — ASSESSMENT & PLAN NOTE
He has stable coronary disease without any symptoms of angina.  He will continue the current dosage of low-dose carvedilol along with Brilinta and aspirin.

## 2022-09-12 NOTE — PROGRESS NOTES
Office Visit    Chief Complaint  Coronary Artery Disease, Hypertension, and Hyperlipidemia    Subjective            Derek Castelan presents to [facility] C[facility]IOLOG[facility]  Mr. Castelan is a 77 years old gentleman who is extremely active and denies any symptoms of angina.  In 2014 he had a non-Q wave myocardial infarction was admitted to the hospital and a cardiac cath revealed extensive thrombotic occlusion of vein graft to the right coronary system.  He was put on aspirin Plavix and aggressive risk factor modification and despite these measures he came back with another myocardial infarction.  At this stage he was found to have extensive thrombotic occlusion of his graft to the circumflex system and he was put on Brilinta and since then he has not had any episodes of angina or occlusive disease or MI.  We have had a hard time convincing his insurance company to give him Brilinta 60 twice daily.      Past Medical History:   Diagnosis Date   • Bladder mass 04/08/2020   • Calculus of kidney    • Cancer (HCC)     Bladder Cancer   • Coronary artery disease    • Coronary artery disease involving coronary bypass graft of native heart without angina pectoris 7/22/2021    with previous bypass surgery (July 2007 x6, LIMA to diagonal 1 and the LAD, SVG to OM1 and OM2, and SVG to sequential graft to the RCA and PDA); Previous myocardial infarction (even when on clopidogrel   • Essential hypertension 7/22/2021    Blood pressure is up but this is his first visit here.  He has appointment tomorrow with Dr. Chao so will defer any changes to him.   • Hematuria 04/08/2020   • High cholesterol    • Hypertension    • Hypothyroidism    • Mixed hyperlipidemia 8/1/2021    LDL was 70 in July 2021.  Patient is maintained on high-dose Crestor 40 mg, he is tolerating this, benefiting from it, and should continue same.       No Known Allergies     Past Surgical History:   Procedure Laterality Date   • BLADDER TUMOR EXCISION     •  CARDIAC SURGERY  2007    open heart   • HERNIA REPAIR  2001   • SKIN CANCER EXCISION      bascal cell        Social History     Tobacco Use   • Smoking status: Never Smoker   • Smokeless tobacco: Never Used   Vaping Use   • Vaping Use: Never used   Substance Use Topics   • Alcohol use: Never   • Drug use: Never       Family History   Problem Relation Age of Onset   • Heart disease Mother    • Diabetes Mother    • Hypertension Mother    • Cancer Sister    • Heart disease Brother         Prior to Admission medications    Medication Sig Start Date End Date Taking? Authorizing Provider   aspirin 81 MG EC tablet aspirin 81 mg oral tablet,delayed release (DR/EC) take 1 tablet (81 mg) by oral route once daily   Active   Yes Lisa Monsalve MD   Brilinta 60 MG tablet tablet Take 1 tablet by mouth 2 (Two) Times a Day. 3/7/22  Yes Soraida Chao MD   carvedilol (COREG) 3.125 MG tablet Take 1 tablet by mouth Every Evening. 3/7/22  Yes Soraida Chao MD   docusate sodium (COLACE) 100 MG capsule Take 100 mg by mouth 2 (Two) Times a Day.   Yes Lisa Monsalve MD   FeroSul 325 (65 Fe) MG tablet TAKE ONE TABLET BY MOUTH EVERY OTHER DAY 3/16/22  Yes Richard Millan MD   levothyroxine (SYNTHROID, LEVOTHROID) 88 MCG tablet Take 1 tablet by mouth Daily. 9/12/22  Yes Richard Millan MD   nitroglycerin (NITRODUR) 0.2 MG/HR patch APPLY IN THE MORNING AND REMOVE IN THE IN THE EVENING 1/19/22  Yes Jeni Velazquez APRN   nitroglycerin (NITROSTAT) 0.4 MG SL tablet Place 1 tablet under the tongue Every 5 (Five) Minutes As Needed for Chest Pain. Take no more than 3 doses in 15 minutes. 8/4/21  Yes Soraida Chao MD   rosuvastatin (CRESTOR) 40 MG tablet TAKE ONE TABLET BY MOUTH DAILY 6/8/22  Yes Soraida Chao MD   azithromycin (Zithromax Z-Suraj) 250 MG tablet Take 2 tablets by mouth on day 1, then 1 tablet daily on days 2-5 8/26/22   Laurie Perry APRN   dexamethasone (DECADRON) 6 MG tablet Take 1 tablet by mouth Daily With  "Breakfast. 8/26/22   Laurie Perry APRN   ezetimibe (ZETIA) 10 MG tablet Take 1 tablet by mouth Daily. 3/7/22   Soraida Chao MD   ondansetron ODT (ZOFRAN-ODT) 4 MG disintegrating tablet Place 1 tablet on the tongue 4 (Four) Times a Day As Needed for Nausea for up to 6 doses. 7/25/22   Tej Mckeon PA-C   ticagrelor (Brilinta) 60 MG tablet tablet Take 1 tablet by mouth 2 (Two) Times a Day. 6/28/22   Soraida Chao MD   levothyroxine (SYNTHROID, LEVOTHROID) 88 MCG tablet Take 1 tablet by mouth Daily. 3/7/22 9/12/22  Soraida Chao MD        Review of Systems   Constitutional: Positive for fatigue.   Respiratory: Positive for cough. Negative for shortness of breath.    Cardiovascular: Negative for chest pain, palpitations and leg swelling.   Neurological: Negative for dizziness, weakness and light-headedness.        Objective     /77   Pulse 51   Ht 177.8 cm (70\")   Wt 85 kg (187 lb 6.4 oz)   BMI 26.89 kg/m²       Physical Exam  Constitutional:       General: He is awake.      Appearance: Normal appearance.   Neck:      Thyroid: No thyromegaly.      Vascular: No carotid bruit or JVD.   Cardiovascular:      Rate and Rhythm: Normal rate and regular rhythm.      Chest Wall: PMI is not displaced.      Pulses: Normal pulses.      Heart sounds: Normal heart sounds, S1 normal and S2 normal. No murmur heard.    No friction rub. No gallop. No S3 or S4 sounds.   Pulmonary:      Effort: Pulmonary effort is normal.      Breath sounds: Normal breath sounds and air entry. No wheezing, rhonchi or rales.   Abdominal:      General: Bowel sounds are normal.      Palpations: Abdomen is soft. There is no mass.      Tenderness: There is no abdominal tenderness.   Musculoskeletal:      Cervical back: Neck supple.      Right lower leg: No edema.      Left lower leg: No edema.   Neurological:      Mental Status: He is alert and oriented to person, place, and time.   Psychiatric:         Mood and Affect: Mood normal.    "      Behavior: Behavior is cooperative.       Lab Results   Component Value Date    PROBNP 344.0 09/08/2022     CMP    CMP 3/1/22 6/15/22 9/8/22   Glucose 89 91 90   BUN 20 19 24 (A)   Creatinine 1.15 1.12 1.16   Sodium 140 138 137   Potassium 4.1 4.4 4.2   Chloride 104 106 102   Calcium 9.2 9.2 9.3   Albumin 4.40 4.50 3.90   Total Bilirubin 0.5 0.6 0.6   Alkaline Phosphatase 86 70 68   AST (SGOT) 42 (A) 54 (A) 40   ALT (SGPT) 40 44 (A) 38   (A) Abnormal value            CBC w/diff    CBC w/Diff 3/1/22 9/8/22   WBC 11.88 (A) 8.29   RBC 4.44 4.08 (A)   Hemoglobin 14.1 12.9 (A)   Hematocrit 41.4 39.8   MCV 93.2 97.5 (A)   MCH 31.8 31.6   MCHC 34.1 32.4   RDW 12.7 13.0   Platelets 178 187   Neutrophil Rel % 76.1 (A) 60.9   Immature Granulocyte Rel % 0.3 0.2   Lymphocyte Rel % 15.9 (A) 29.3   Monocyte Rel % 5.9 7.5   Eosinophil Rel % 1.5 1.9   Basophil Rel % 0.3 0.2   (A) Abnormal value             Lipid Panel    Lipid Panel 3/1/22 6/15/22 9/8/22   Total Cholesterol 148 120 148   Triglycerides 94 64 88   HDL Cholesterol 55 49 54   VLDL Cholesterol 17 14 17   LDL Cholesterol  76 57 77   LDL/HDL Ratio 1.35 1.19 1.41            Lab Results   Component Value Date    TSH 4.120 09/08/2022    TSH 1.990 03/01/2022    TSH 2.680 03/09/2020      Lab Results   Component Value Date    FREET4 1.35 09/08/2022    FREET4 1.34 03/01/2022    FREET4 1.3 03/09/2020      No results found for: DDIMERQUANT  Magnesium   Date Value Ref Range Status   09/08/2022 1.9 1.6 - 2.4 mg/dL Final      No results found for: DIGOXIN                Result Review :                           Assessment and Plan        Diagnoses and all orders for this visit:    1. Coronary artery disease involving coronary bypass graft of native heart without angina pectoris (Primary)  Assessment & Plan:  He has stable coronary disease without any symptoms of angina.  He will continue the current dosage of low-dose carvedilol along with Brilinta and aspirin.    Orders:  -      Comprehensive Metabolic Panel; Future  -     Lipid Panel; Future  -     Comprehensive Metabolic Panel; Future  -     Magnesium; Future  -     Lipid Panel; Future  -     Comprehensive Metabolic Panel; Future  -     Magnesium; Future    2. Essential hypertension  Assessment & Plan:  His home blood pressures are very well regulated.  He will continue low-dose carvedilol and Nitropatch.    Orders:  -     Comprehensive Metabolic Panel; Future  -     Lipid Panel; Future  -     Comprehensive Metabolic Panel; Future  -     Magnesium; Future  -     Lipid Panel; Future  -     Comprehensive Metabolic Panel; Future  -     Magnesium; Future    3. Mixed hyperlipidemia  Assessment & Plan:  His lipids are finally under good control with Crestor and ezetimibe.  However his wife had noticed that he had mental status changes and mild liver dysfunction.  As soon as they stopped his Zetia within 10 days he came back to normal.  I am not sure if it is related to ezetimibe and I have told him to try half dose and if the same thing happens then we will we will know for sure.  He will get a chemistry panel in 1 month and and chemistry panel with lipid panel in 3 months.    Orders:  -     Comprehensive Metabolic Panel; Future  -     Lipid Panel; Future  -     Comprehensive Metabolic Panel; Future  -     Magnesium; Future  -     Lipid Panel; Future  -     Comprehensive Metabolic Panel; Future  -     Magnesium; Future    4. S/P CABG x 6  -     Comprehensive Metabolic Panel; Future  -     Lipid Panel; Future  -     Comprehensive Metabolic Panel; Future  -     Magnesium; Future  -     Lipid Panel; Future  -     Comprehensive Metabolic Panel; Future  -     Magnesium; Future    Other orders  -     carvedilol (COREG) 3.125 MG tablet; Take 1 tablet by mouth Every Evening.  Dispense: 90 tablet; Refill: 3  -     ezetimibe (ZETIA) 10 MG tablet; Take 0.5 tablets by mouth Daily.  Dispense: 90 tablet; Refill: 3          Follow Up     Return in about 9  months (around 6/12/2023) for FU WITH DR. CLARKE.    Patient was given instructions and counseling regarding his condition or for health maintenance advice. Please see specific information pulled into the AVS if appropriate.     Soraida Chao MD  09/12/22 13:44 EDT

## 2022-09-12 NOTE — ASSESSMENT & PLAN NOTE
His lipids are finally under good control with Crestor and ezetimibe.  However his wife had noticed that he had mental status changes and mild liver dysfunction.  As soon as they stopped his Zetia within 10 days he came back to normal.  I am not sure if it is related to ezetimibe and I have told him to try half dose and if the same thing happens then we will we will know for sure.  He will get a chemistry panel in 1 month and and chemistry panel with lipid panel in 3 months.

## 2022-09-12 NOTE — TELEPHONE ENCOUNTER
Caller: VIOLA VIKTOR    Relationship: Emergency Contact    Best call back number: [phone]     Requested Prescriptions:   Requested Prescriptions     Pending Prescriptions Disp Refills   • levothyroxine (SYNTHROID, LEVOTHROID) 88 MCG tablet 90 tablet 3     Sig: Take 1 tablet by mouth Daily.        Pharm[facility] where request should be sent: [facility] [address] [place], [place] [address] [place] [facility] [address] [facility] [address] & [facility] [phone] [facility] [phone] FX     Additional details provided by patient: 3 DAYS LEFT OF MEDICATION       Does the patient have less than a 3 day supply:  [x] Yes  [] No    Jair Maldonado Rep   09/12/22 11:28 EDT

## 2022-10-06 RX ORDER — FERROUS SULFATE 325(65) MG
TABLET ORAL
Qty: 45 TABLET | Refills: 1 | Status: SHIPPED | OUTPATIENT
Start: 2022-10-06 | End: 2023-04-05

## 2022-11-23 ENCOUNTER — TELEPHONE (OUTPATIENT)
Dept: CARDIOLOGY | Facility: CLINIC | Age: 77
End: 2022-11-23

## 2022-11-23 RX ORDER — NITROGLYCERIN 40 MG/1
PATCH TRANSDERMAL
Qty: 90 PATCH | Refills: 1 | Status: SHIPPED | OUTPATIENT
Start: 2022-11-23

## 2022-11-23 NOTE — TELEPHONE ENCOUNTER
Received VM regarding low EF from patient wife.     Patient and wife were seen by Dr Chao in the past. Patient received a call was informed by Dr Chao he is not a heart failure clinic candidate even though 41% EF. Patient wife found this to be new information and caused concern. Looked over past Stress test, and discussed the result and was able to comfort patient and wife that his function has not decreased greatly since stress test completed in 2018. Patient and patient wife expressed appreciation for the call.     Patient is in need of refill nitro patch. Order placed.

## 2023-01-19 ENCOUNTER — TELEPHONE (OUTPATIENT)
Dept: CARDIOLOGY | Facility: CLINIC | Age: 78
End: 2023-01-19
Payer: COMMERCIAL

## 2023-01-19 DIAGNOSIS — E78.2 MIXED HYPERLIPIDEMIA: ICD-10-CM

## 2023-01-19 DIAGNOSIS — I25.810 CORONARY ARTERY DISEASE INVOLVING CORONARY BYPASS GRAFT OF NATIVE HEART WITHOUT ANGINA PECTORIS: Primary | ICD-10-CM

## 2023-01-19 DIAGNOSIS — I10 ESSENTIAL HYPERTENSION: ICD-10-CM

## 2023-01-19 NOTE — TELEPHONE ENCOUNTER
Per Dr Chao's note. Patient is to have labs drawn in 3 months from last visit. His last visit was in 9/22. Orders placed for CMP and Lipid panel.

## 2023-01-20 ENCOUNTER — LAB (OUTPATIENT)
Dept: LAB | Facility: HOSPITAL | Age: 78
End: 2023-01-20
Payer: COMMERCIAL

## 2023-01-20 DIAGNOSIS — E78.2 MIXED HYPERLIPIDEMIA: ICD-10-CM

## 2023-01-20 DIAGNOSIS — I25.810 CORONARY ARTERY DISEASE INVOLVING CORONARY BYPASS GRAFT OF NATIVE HEART WITHOUT ANGINA PECTORIS: ICD-10-CM

## 2023-01-20 DIAGNOSIS — I10 ESSENTIAL HYPERTENSION: ICD-10-CM

## 2023-01-20 LAB
ALBUMIN SERPL-MCNC: 4.3 G/DL (ref 3.5–5.2)
ALBUMIN/GLOB SERPL: 1.5 G/DL
ALP SERPL-CCNC: 74 U/L (ref 39–117)
ALT SERPL W P-5'-P-CCNC: 57 U/L (ref 1–41)
ANION GAP SERPL CALCULATED.3IONS-SCNC: 10.1 MMOL/L (ref 5–15)
AST SERPL-CCNC: 56 U/L (ref 1–40)
BILIRUB SERPL-MCNC: 0.5 MG/DL (ref 0–1.2)
BUN SERPL-MCNC: 22 MG/DL (ref 8–23)
BUN/CREAT SERPL: 19.1 (ref 7–25)
CALCIUM SPEC-SCNC: 9.7 MG/DL (ref 8.6–10.5)
CHLORIDE SERPL-SCNC: 106 MMOL/L (ref 98–107)
CHOLEST SERPL-MCNC: 142 MG/DL (ref 0–200)
CO2 SERPL-SCNC: 25.9 MMOL/L (ref 22–29)
CREAT SERPL-MCNC: 1.15 MG/DL (ref 0.76–1.27)
EGFRCR SERPLBLD CKD-EPI 2021: 66.4 ML/MIN/1.73
GLOBULIN UR ELPH-MCNC: 2.9 GM/DL
GLUCOSE SERPL-MCNC: 91 MG/DL (ref 65–99)
HDLC SERPL-MCNC: 63 MG/DL (ref 40–60)
LDLC SERPL CALC-MCNC: 66 MG/DL (ref 0–100)
LDLC/HDLC SERPL: 1.04 {RATIO}
POTASSIUM SERPL-SCNC: 4.3 MMOL/L (ref 3.5–5.2)
PROT SERPL-MCNC: 7.2 G/DL (ref 6–8.5)
SODIUM SERPL-SCNC: 142 MMOL/L (ref 136–145)
TRIGL SERPL-MCNC: 66 MG/DL (ref 0–150)
VLDLC SERPL-MCNC: 13 MG/DL (ref 5–40)

## 2023-01-20 PROCEDURE — 80053 COMPREHEN METABOLIC PANEL: CPT

## 2023-01-20 PROCEDURE — 80061 LIPID PANEL: CPT

## 2023-01-20 PROCEDURE — 36415 COLL VENOUS BLD VENIPUNCTURE: CPT

## 2023-01-26 ENCOUNTER — TELEPHONE (OUTPATIENT)
Dept: CARDIOLOGY | Facility: CLINIC | Age: 78
End: 2023-01-26
Payer: COMMERCIAL

## 2023-01-26 NOTE — TELEPHONE ENCOUNTER
Procedure: CDL Clearance    Medication Directive: NA    PMH: CABG- 2007,     Last Seen: 09/12/22    Stress test: 11/2/21  • Left ventricular ejection fraction is mildly reduced. (Calculated EF = 41%).  • Findings consistent with a normal ECG stress test.  • Myocardial perfusion imaging indicates a medium-sized infarct located in the basal inferior lateral wall with mild amara-infarct ischemia.  • Impressions are consistent with an intermediate risk study.  • The current study reveals no changes compared to prior study of 2018

## 2023-01-31 ENCOUNTER — TELEPHONE (OUTPATIENT)
Dept: CARDIOLOGY | Facility: CLINIC | Age: 78
End: 2023-01-31
Payer: COMMERCIAL

## 2023-01-31 DIAGNOSIS — E78.2 MIXED HYPERLIPIDEMIA: Primary | ICD-10-CM

## 2023-01-31 NOTE — TELEPHONE ENCOUNTER
Attempted to call patient with information CDL Clearance letter is ready for . Left VM with call back number.     Mailed letter to patient home.

## 2023-01-31 NOTE — TELEPHONE ENCOUNTER
Patient wife called regarding liver enzymes, patient is on Zetia and it does it every time per patient wife.     Patient has some mental changes, and elevated enzymes- patient has only been able to partially tolerated with half dose.  Would like to try a different medication if possible.     Patient wife is very concerned due to low EF noted in previous Stress test 11/02/21- states no ECHO has been ordered to recheck. Please advise if patient will need f/u prior to getting ECHO ordered.

## 2023-01-31 NOTE — TELEPHONE ENCOUNTER
Please provide cardiac clearance for CDL.  Remind patient when he has his appointment this summer with Dr. 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

## 2023-01-31 NOTE — TELEPHONE ENCOUNTER
----- Message from ANDI Cox sent at 1/31/2023  9:47 AM EST -----  Cholesterol levels are good.  His electrolytes, and renal function are all normal.  His liver enzymes are very mildly elevated, I would like for him to have a repeat CMP drawn a few days prior to his next follow-up appointment so we can continue to monitor those.

## 2023-02-02 DIAGNOSIS — R09.89 SUSPECTED CONGESTIVE HEART FAILURE: Primary | ICD-10-CM

## 2023-02-02 NOTE — TELEPHONE ENCOUNTER
SW patient's wife. Informed wife of medication changes and labs to be drawn. Patient's wife verbalized understanding and appreciation. Patient will need to be fasting.

## 2023-02-02 NOTE — TELEPHONE ENCOUNTER
He can go ahead and stop the Zetia if he is having these symptoms.  We can plan for him to recheck his lipid profile and chemistries prior to his next follow-up, and then we can make new medication recommendations at that time.  Please also inform patient I will go ahead and place orders to have a repeat echocardiogram to reevaluate his cardiac function.  e

## 2023-02-03 RX ORDER — TICAGRELOR 60 MG/1
60 TABLET ORAL 2 TIMES DAILY
Qty: 180 TABLET | Refills: 3 | Status: SHIPPED | OUTPATIENT
Start: 2023-02-03

## 2023-02-20 ENCOUNTER — OFFICE VISIT (OUTPATIENT)
Dept: INTERNAL MEDICINE | Facility: CLINIC | Age: 78
End: 2023-02-20
Payer: COMMERCIAL

## 2023-02-20 VITALS
HEART RATE: 57 BPM | HEIGHT: 70 IN | TEMPERATURE: 97.3 F | BODY MASS INDEX: 28.26 KG/M2 | WEIGHT: 197.4 LBS | OXYGEN SATURATION: 96 % | DIASTOLIC BLOOD PRESSURE: 75 MMHG | SYSTOLIC BLOOD PRESSURE: 151 MMHG

## 2023-02-20 DIAGNOSIS — E03.4 ATROPHY OF THYROID: Primary | ICD-10-CM

## 2023-02-20 DIAGNOSIS — D50.9 IRON DEFICIENCY ANEMIA, UNSPECIFIED IRON DEFICIENCY ANEMIA TYPE: ICD-10-CM

## 2023-02-20 DIAGNOSIS — I25.810 CORONARY ARTERY DISEASE INVOLVING CORONARY BYPASS GRAFT OF NATIVE HEART WITHOUT ANGINA PECTORIS: Chronic | ICD-10-CM

## 2023-02-20 DIAGNOSIS — E55.9 VITAMIN D DEFICIENCY: ICD-10-CM

## 2023-02-20 DIAGNOSIS — E78.2 MIXED HYPERLIPIDEMIA: Chronic | ICD-10-CM

## 2023-02-20 DIAGNOSIS — R06.09 DYSPNEA ON EXERTION: ICD-10-CM

## 2023-02-20 DIAGNOSIS — Z00.00 WELL ADULT EXAM: ICD-10-CM

## 2023-02-20 DIAGNOSIS — I10 ESSENTIAL HYPERTENSION: Chronic | ICD-10-CM

## 2023-02-20 PROCEDURE — 99214 OFFICE O/P EST MOD 30 MIN: CPT | Performed by: INTERNAL MEDICINE

## 2023-02-20 NOTE — ASSESSMENT & PLAN NOTE
TSH was 4.1 in 9/22.  Patient appears stable to continue with 88 mcg daily, will have labs obtained at the time of his lipids with cardiology this summer.

## 2023-02-20 NOTE — PROGRESS NOTES
"Chief Complaint  Hypertension (Pt here for follow up)    Subjective          Derek Castelan presents to [facility] INTERNAL MEDICINE     History of present illness:  77-year-old male with underlying hypertension, hyperlipidemia, resultant coronary artery disease status post 6 vessel bypass in 2007, who was seen as a New Pt 8/21 and who is coming in now 2/23 nearly 6 months late for a 6-month appointment.  We we will go over his med list in detail, review any recent labs he may have had, and address any new concerns.      Review of Systems   Constitutional: Positive for fatigue. Negative for appetite change and fever.   HENT: Negative for congestion and ear pain.    Eyes: Negative for blurred vision.   Respiratory: Negative for cough, chest tightness, shortness of breath and wheezing.    Cardiovascular: Negative for chest pain, palpitations and leg swelling.   Gastrointestinal: Negative for abdominal pain.   Genitourinary: Negative for difficulty urinating, dysuria and hematuria.   Musculoskeletal: Negative for arthralgias and gait problem.   Skin: Negative for skin lesions.   Neurological: Negative for syncope, memory problem and confusion.   Hematological: Bruises/bleeds easily.   Psychiatric/Behavioral: Negative for self-injury and depressed mood.       Objective   Vital Signs:   /75   Pulse 57   Temp 97.3 °F (36.3 °C)   Ht 177.8 cm (70\")   Wt 89.5 kg (197 lb 6.4 oz)   SpO2 96%   BMI 28.32 kg/m²           Physical Exam  Vitals and nursing note reviewed.   Constitutional:       General: He is not in acute distress.     Appearance: Normal appearance. He is not toxic-appearing.   HENT:      Head: Atraumatic.      Right Ear: External ear normal.      Left Ear: External ear normal.      Nose: Nose normal.      Mouth/Throat:      Mouth: Mucous membranes are moist.   Eyes:      General:         Right eye: No discharge.         Left eye: No discharge.      Extraocular Movements: Extraocular " movements intact.      Pupils: Pupils are equal, round, and reactive to light.   Neck:      Comments: No carotid bruit.  Cardiovascular:      Rate and Rhythm: Normal rate and regular rhythm.      Pulses: Normal pulses.      Heart sounds: Normal heart sounds. No murmur heard.    No gallop.      Comments: Heart tones normal, no ectopy, no S3.  Pulmonary:      Effort: Pulmonary effort is normal. No respiratory distress.      Breath sounds: No wheezing, rhonchi or rales.      Comments: Lung fields clear bilaterally.  Abdominal:      General: There is no distension.      Palpations: Abdomen is soft. There is no mass.      Tenderness: There is no abdominal tenderness. There is no guarding.   Musculoskeletal:         General: No swelling or tenderness.      Cervical back: No tenderness.      Right lower leg: No edema.      Left lower leg: No edema.      Comments: No edema.   Skin:     General: Skin is warm and dry.      Findings: No rash.   Neurological:      General: No focal deficit present.      Mental Status: He is alert and oriented to person, place, and time. Mental status is at baseline.      Motor: No weakness.      Gait: Gait normal.   Psychiatric:         Mood and Affect: Mood normal.         Thought Content: Thought content normal.          Result Review :   The following data was reviewed by: Richard Millan MD on 08/03/2021:                  Assessment and Plan    Diagnoses and all orders for this visit:    1. Atrophy of thyroid (Primary)  Assessment & Plan:  TSH was 4.1 in 9/22.  Patient appears stable to continue with 88 mcg daily, will have labs obtained at the time of his lipids with cardiology this summer.    Orders:  -     TSH; Future    2. Mixed hyperlipidemia  Assessment & Plan:  Patient's LDL was only 66 last month, but he had to discontinue Zetia due to mild elevations in his LFTs.  He is maintained on full dose Crestor only, he will have repeat labs before his appointment with cardiology in June.   Otherwise continue with full dose Crestor only.      3. Essential hypertension  Assessment & Plan:  Blood pressure remains well controlled as of his 2/23 office visit.  He stable to continue just low-dose carvedilol along with nitrodur.      4. Coronary artery disease involving coronary bypass graft of native heart without angina pectoris  Overview:  Previous bypass surgery (July 2007 x6, LIMA to diagonal 1 and the LAD, SVG to OM1 and OM2, and SVG to sequential graft to the RCA and PDA); Previous myocardial infarction (even when on clopidogrel)    SPECT 11/21:  · Left ventricular ejection fraction is mildly reduced. (Calculated EF = 41%).  · Findings consistent with a normal ECG stress test.  · Myocardial perfusion imaging indicates a medium-sized infarct located in the basal inferior lateral wall with mild amara-infarct ischemia.  · Impressions are consistent with an intermediate risk study.  · The current study reveals no changes compared to prior study of 2018        Assessment & Plan:  Patient no ischemic symptoms as of 2/23 office visit.  He is maintained on aspirin/brilinta and low-dose Nitro-Dur as well as very low-dose carvedilol.  Certainly appropriate to continue same, he has follow-up with cardiology in about 4 months and will have a repeat stress imaging test this fall.      5. Vitamin D deficiency  -     Vitamin D,25-Hydroxy; Future    6. Well adult exam  -     CBC & Differential; Future    7. Iron deficiency anemia, unspecified iron deficiency anemia type  -     Ferritin; Future  -     Iron Profile; Future    8. Dyspnea on exertion  -     BNP; Future         CARDIOLOGY OV 1/4/21:    Derek Castelan is a 75 year old /White male who denies any chest pain or pressure. No palpitations, shortness of breath, swelling, dizziness, syncope, PND, or orthopnea. Cardiac-wise, he is feeling very well, but he has gained 8 pounds since his last visit. He has had bladder cancer and surgery since he was last  here, but no chemotherapy. His blood pressures are monitored at home, but he did not bring his readings and says they are in good range. 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   PAST MEDICAL HISTORY: Coronary artery disease with previous bypass surgery (July 2007 x6, LIMA to diagonal 1 and the LAD, SVG to OM1 and OM2, and SVG to sequential graft to the RCA and PDA); Previous myocardial infarction (even when on clopidogrel); Hyperlipidemia; Hypertension; Hypothyroidism.   PSYCHOSOCIAL HISTORY: Denies tobacco use. Denies alcohol use.   CURRENT MEDICATIONS: Brilinta 60 mg b.i.d. long-term; carvedilol 3.125 mg q. p.m.; levothyroxine 88 mcg q. a.m.; rosuvastatin 40 mg q. p.m.; NitroPatch 0.2 mg/hour; aspirin 81 mg daily; nitroglycerin 0.4 mg p.r.n.; Centrum Silver.       ALLERGIES:  No known drug allergies.     1.  Hypertension, uncertain control.  2.  Coronary artery disease with previous MI and bypass without angina.  3.  Hyperlipidemia, at goal.        Plan      1.  Do a blood pressure log, and we will adjust hypertensive medications if needed.    2.  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    3.  Continue Brilinta long-term.  4.  Continue carvedilol for his hypertension.  5.  Continue rosuvastatin for his cholesterols.  6.  Follow up in 6 months with labs and an EKG or earlier if needed.          UROLOGY 5/7/2020:    High Grade TI urothelial cancer. I have discussed the management of high grade T1 urothelial cancer of the bladder with the patient, including a 25-40% likelihood of detecting T2 or greater disease at re-resection. I have discussed the risks and prognosis of High grade TI disease with the patient including progression to MIBC and recurrence of HGTI. I have discussed bladder preserving management with BCG and intravesical agents, and the risks of infection, bladder screening, recurrence and progression, as well  as the need for maintenance with BCG. I have discussed the role of early cystectomy for patients with HGTI and the excellent overall survival for patient with HGT1 bladder cancer who have a cystectomy. I mentioned that even with HGT1 there is a 10-15% chance of being discovered to have salome metastasis.      Recent pathology was HGT1 and discussed with patient that guidelines dictate re-resection in about 6 weeks, ensuring adequate healing prior- aware that Brillinta will have to be held again.  Will plan for repeat bilateral retrogrades at that time given history of bilateral hydronephrosis at time of hematuria.   Given the above limitations visually at time of resection as above and per operative note, will discuss with pathology if frozen sections at time of re-resection is possible if needed     Follow Up   Return in about 6 months (around 8/20/2023).  Patient was given instructions and counseling regarding his condition or for health maintenance advice. Please see specific information pulled into the AVS if appropriate.

## 2023-02-20 NOTE — ASSESSMENT & PLAN NOTE
Patient's LDL was only 66 last month, but he had to discontinue Zetia due to mild elevations in his LFTs.  He is maintained on full dose Crestor only, he will have repeat labs before his appointment with cardiology in June.  Otherwise continue with full dose Crestor only.

## 2023-02-20 NOTE — ASSESSMENT & PLAN NOTE
Blood pressure remains well controlled as of his 2/23 office visit.  He stable to continue just low-dose carvedilol along with nitrodur.

## 2023-02-20 NOTE — ASSESSMENT & PLAN NOTE
Patient no ischemic symptoms as of 2/23 office visit.  He is maintained on aspirin/brilinta and low-dose Nitro-Dur as well as very low-dose carvedilol.  Certainly appropriate to continue same, he has follow-up with cardiology in about 4 months and will have a repeat stress imaging test this fall.

## 2023-02-24 ENCOUNTER — TELEPHONE (OUTPATIENT)
Dept: CARDIOLOGY | Facility: CLINIC | Age: 78
End: 2023-02-24
Payer: COMMERCIAL

## 2023-02-24 NOTE — TELEPHONE ENCOUNTER
----- Message from ANDI Cox sent at 2/23/2023  7:51 PM EST -----  Echocardiogram shows a mild decrease in LV systolic function with an EF of 50%.  The decrease in the muscle function is consistent with his previous heart attack.  He has mild mitral valve and tricuspid valve regurgitation.  For now he should continue his current medications, keep his previously scheduled follow-up appointment.

## 2023-03-09 ENCOUNTER — TRANSCRIBE ORDERS (OUTPATIENT)
Dept: ADMINISTRATIVE | Facility: HOSPITAL | Age: 78
End: 2023-03-09
Payer: COMMERCIAL

## 2023-03-09 DIAGNOSIS — R31.9 HEMATURIA, UNSPECIFIED TYPE: Primary | ICD-10-CM

## 2023-03-09 RX ORDER — ROSUVASTATIN CALCIUM 40 MG/1
TABLET, COATED ORAL
Qty: 90 TABLET | Refills: 2 | Status: SHIPPED | OUTPATIENT
Start: 2023-03-09

## 2023-03-17 ENCOUNTER — TELEPHONE (OUTPATIENT)
Dept: CARDIOLOGY | Facility: CLINIC | Age: 78
End: 2023-03-17
Payer: COMMERCIAL

## 2023-03-17 NOTE — TELEPHONE ENCOUNTER
Procedure: Cystoscopy w/ TURBT (GENERAL)    Medication Directive: Brilinta and aspirin    PMH: CAD sp CABG x 6, HTN, HLD    Last Seen: 09/12/22    STRESS : 11/02/21    • Left ventricular ejection fraction is mildly reduced. (Calculated EF = 41%).  • Findings consistent with a normal ECG stress test.  • Myocardial perfusion imaging indicates a medium-sized infarct located in the basal inferior lateral wall with mild amara-infarct ischemia.  • Impressions are consistent with an intermediate risk study.  • The current study reveals no changes compared to prior study of 2018    ECHO: 02/20/23  Overall LV ejection fraction is 50% with moderate hypokinesis of the basal to mid inferolateral wall, consistent with old myocardial infarction.  •  Left ventricular diastolic function is consistent with (grade I) impaired relaxation.  •  There is mild mitral regurgitation, which is eccentrically directed.  •  There is mild tricuspid regurgitation.  Estimated right ventricular systolic pressure from tricuspid regurgitation is normal (<35 mmHg

## 2023-03-19 NOTE — TELEPHONE ENCOUNTER
Mr. Rivers may proceed with upcoming cystoscopy with TURBT as scheduled at moderate risk for perioperative cardiac events.  He may hold aspirin up to 7 days prior to procedure and Brilinta up to 5 days prior to procedure as needed.

## 2023-04-05 RX ORDER — FERROUS SULFATE 325(65) MG
TABLET ORAL
Qty: 45 TABLET | Refills: 1 | Status: SHIPPED | OUTPATIENT
Start: 2023-04-05

## 2023-04-06 ENCOUNTER — HOSPITAL ENCOUNTER (OUTPATIENT)
Dept: CT IMAGING | Facility: HOSPITAL | Age: 78
Discharge: HOME | End: 2023-04-06
Admitting: UROLOGY
Payer: COMMERCIAL

## 2023-04-06 DIAGNOSIS — R31.9 HEMATURIA, UNSPECIFIED TYPE: ICD-10-CM

## 2023-04-06 LAB
CREAT BLDA-MCNC: 1.3 MG/DL
EGFRCR SERPLBLD CKD-EPI 2021: 57.3 ML/MIN/1.73

## 2023-04-06 PROCEDURE — 25510000001 IOPAMIDOL PER 1 ML: Performed by: UROLOGY

## 2023-04-06 PROCEDURE — 82565 ASSAY OF CREATININE: CPT

## 2023-04-06 PROCEDURE — 74178 CT ABD&PLV WO CNTR FLWD CNTR: CPT

## 2023-04-06 RX ADMIN — IOPAMIDOL 100 ML: 755 INJECTION, SOLUTION INTRAVENOUS at 11:35

## 2023-05-15 ENCOUNTER — TELEPHONE (OUTPATIENT)
Dept: CARDIOLOGY | Facility: CLINIC | Age: 78
End: 2023-05-15
Payer: COMMERCIAL

## 2023-05-15 NOTE — TELEPHONE ENCOUNTER
The [facility] received a fax that requires your attention. The document has been indexed to the patient’s chart for your review.      Reason for sending: RE-ENROLLMENT APPLICATION    Documents Description: EXT MED RECS_[employer] [id number]    Name of Sender: [employer]    Date Indexed: 5-15-23

## 2023-05-17 ENCOUNTER — TELEPHONE (OUTPATIENT)
Dept: CARDIOLOGY | Facility: CLINIC | Age: 78
End: 2023-05-17
Payer: COMMERCIAL

## 2023-05-17 NOTE — TELEPHONE ENCOUNTER
The [facility] received a fax that requires your attention. The document has been indexed to the patient’s chart for your review.      Reason for sending: EXTERNAL MEDICAL RECORD NOTIFICATION     Documents Description: MISSING INFORMATION    Name of Sender: [employer]     Date Indexed: 5.16.23

## 2023-05-19 ENCOUNTER — LAB (OUTPATIENT)
Dept: LAB | Facility: HOSPITAL | Age: 78
End: 2023-05-19
Payer: COMMERCIAL

## 2023-05-19 ENCOUNTER — TRANSCRIBE ORDERS (OUTPATIENT)
Dept: LAB | Facility: HOSPITAL | Age: 78
End: 2023-05-19
Payer: COMMERCIAL

## 2023-05-19 DIAGNOSIS — R31.0 GROSS HEMATURIA: Primary | ICD-10-CM

## 2023-05-19 DIAGNOSIS — R31.0 GROSS HEMATURIA: ICD-10-CM

## 2023-05-19 PROCEDURE — 87077 CULTURE AEROBIC IDENTIFY: CPT

## 2023-05-19 PROCEDURE — 87086 URINE CULTURE/COLONY COUNT: CPT

## 2023-05-19 PROCEDURE — 87186 SC STD MICRODIL/AGAR DIL: CPT

## 2023-05-21 LAB — BACTERIA SPEC AEROBE CULT: ABNORMAL

## 2023-05-22 ENCOUNTER — TELEPHONE (OUTPATIENT)
Dept: CARDIOLOGY | Facility: CLINIC | Age: 78
End: 2023-05-22
Payer: COMMERCIAL

## 2023-05-22 RX ORDER — NITROGLYCERIN 40 MG/1
PATCH TRANSDERMAL
Qty: 90 PATCH | Refills: 1 | Status: SHIPPED | OUTPATIENT
Start: 2023-05-22

## 2023-06-02 ENCOUNTER — TELEPHONE (OUTPATIENT)
Dept: CARDIOLOGY | Facility: CLINIC | Age: 78
End: 2023-06-02

## 2023-06-02 NOTE — TELEPHONE ENCOUNTER
The [facility] received a fax that requires your attention. The document has been indexed to the patient’s chart for your review.      Reason for sending: EXTERNAL MEDICAL RECORD NOTIFICATION     Documents Description: SHIPMENT NOTIFICATION      Name of Sender: [employer]    Date Indexed: 6.1.23

## 2023-06-08 ENCOUNTER — LAB (OUTPATIENT)
Dept: LAB | Facility: HOSPITAL | Age: 78
End: 2023-06-08
Payer: COMMERCIAL

## 2023-06-08 DIAGNOSIS — Z95.1 S/P CABG X 6: ICD-10-CM

## 2023-06-08 DIAGNOSIS — I25.810 CORONARY ARTERY DISEASE INVOLVING CORONARY BYPASS GRAFT OF NATIVE HEART WITHOUT ANGINA PECTORIS: Chronic | ICD-10-CM

## 2023-06-08 DIAGNOSIS — Z00.00 WELL ADULT EXAM: ICD-10-CM

## 2023-06-08 DIAGNOSIS — E78.2 MIXED HYPERLIPIDEMIA: ICD-10-CM

## 2023-06-08 DIAGNOSIS — I10 ESSENTIAL HYPERTENSION: Chronic | ICD-10-CM

## 2023-06-08 DIAGNOSIS — E55.9 VITAMIN D DEFICIENCY: ICD-10-CM

## 2023-06-08 DIAGNOSIS — R06.09 DYSPNEA ON EXERTION: ICD-10-CM

## 2023-06-08 DIAGNOSIS — D50.9 IRON DEFICIENCY ANEMIA, UNSPECIFIED IRON DEFICIENCY ANEMIA TYPE: ICD-10-CM

## 2023-06-08 DIAGNOSIS — E03.4 ATROPHY OF THYROID: ICD-10-CM

## 2023-06-08 LAB
25(OH)D3 SERPL-MCNC: 27.1 NG/ML (ref 30–100)
ALBUMIN SERPL-MCNC: 3.5 G/DL (ref 3.5–5.2)
ALBUMIN/GLOB SERPL: 1.2 G/DL
ALP SERPL-CCNC: 75 U/L (ref 39–117)
ALT SERPL W P-5'-P-CCNC: 18 U/L (ref 1–41)
ANION GAP SERPL CALCULATED.3IONS-SCNC: 10.7 MMOL/L (ref 5–15)
AST SERPL-CCNC: 25 U/L (ref 1–40)
BASOPHILS # BLD AUTO: 0.03 10*3/MM3 (ref 0–0.2)
BASOPHILS NFR BLD AUTO: 0.4 % (ref 0–1.5)
BILIRUB SERPL-MCNC: 0.7 MG/DL (ref 0–1.2)
BUN SERPL-MCNC: 15 MG/DL (ref 8–23)
BUN/CREAT SERPL: 12.8 (ref 7–25)
CALCIUM SPEC-SCNC: 9.1 MG/DL (ref 8.6–10.5)
CHLORIDE SERPL-SCNC: 104 MMOL/L (ref 98–107)
CHOLEST SERPL-MCNC: 126 MG/DL (ref 0–200)
CO2 SERPL-SCNC: 21.3 MMOL/L (ref 22–29)
CREAT SERPL-MCNC: 1.17 MG/DL (ref 0.76–1.27)
DEPRECATED RDW RBC AUTO: 45 FL (ref 37–54)
EGFRCR SERPLBLD CKD-EPI 2021: 65 ML/MIN/1.73
EOSINOPHIL # BLD AUTO: 0.33 10*3/MM3 (ref 0–0.4)
EOSINOPHIL NFR BLD AUTO: 4.4 % (ref 0.3–6.2)
ERYTHROCYTE [DISTWIDTH] IN BLOOD BY AUTOMATED COUNT: 13.3 % (ref 12.3–15.4)
FERRITIN SERPL-MCNC: 179 NG/ML (ref 30–400)
GLOBULIN UR ELPH-MCNC: 2.9 GM/DL
GLUCOSE SERPL-MCNC: 101 MG/DL (ref 65–99)
HCT VFR BLD AUTO: 29.1 % (ref 37.5–51)
HDLC SERPL-MCNC: 43 MG/DL (ref 40–60)
HGB BLD-MCNC: 9.8 G/DL (ref 13–17.7)
IMM GRANULOCYTES # BLD AUTO: 0.02 10*3/MM3 (ref 0–0.05)
IMM GRANULOCYTES NFR BLD AUTO: 0.3 % (ref 0–0.5)
IRON 24H UR-MRATE: 25 MCG/DL (ref 59–158)
IRON SATN MFR SERPL: 9 % (ref 20–50)
LDLC SERPL CALC-MCNC: 68 MG/DL (ref 0–100)
LDLC/HDLC SERPL: 1.6 {RATIO}
LYMPHOCYTES # BLD AUTO: 1.32 10*3/MM3 (ref 0.7–3.1)
LYMPHOCYTES NFR BLD AUTO: 17.6 % (ref 19.6–45.3)
MAGNESIUM SERPL-MCNC: 1.8 MG/DL (ref 1.6–2.4)
MCH RBC QN AUTO: 31.2 PG (ref 26.6–33)
MCHC RBC AUTO-ENTMCNC: 33.7 G/DL (ref 31.5–35.7)
MCV RBC AUTO: 92.7 FL (ref 79–97)
MONOCYTES # BLD AUTO: 0.73 10*3/MM3 (ref 0.1–0.9)
MONOCYTES NFR BLD AUTO: 9.8 % (ref 5–12)
NEUTROPHILS NFR BLD AUTO: 5.05 10*3/MM3 (ref 1.7–7)
NEUTROPHILS NFR BLD AUTO: 67.5 % (ref 42.7–76)
NRBC BLD AUTO-RTO: 0 /100 WBC (ref 0–0.2)
NT-PROBNP SERPL-MCNC: 681 PG/ML (ref 0–1800)
PLATELET # BLD AUTO: 195 10*3/MM3 (ref 140–450)
PMV BLD AUTO: 10.7 FL (ref 6–12)
POTASSIUM SERPL-SCNC: 4 MMOL/L (ref 3.5–5.2)
PROT SERPL-MCNC: 6.4 G/DL (ref 6–8.5)
RBC # BLD AUTO: 3.14 10*6/MM3 (ref 4.14–5.8)
SODIUM SERPL-SCNC: 136 MMOL/L (ref 136–145)
TIBC SERPL-MCNC: 292 MCG/DL (ref 298–536)
TRANSFERRIN SERPL-MCNC: 196 MG/DL (ref 200–360)
TRIGL SERPL-MCNC: 72 MG/DL (ref 0–150)
TSH SERPL DL<=0.05 MIU/L-ACNC: 6.98 UIU/ML (ref 0.27–4.2)
VLDLC SERPL-MCNC: 15 MG/DL (ref 5–40)
WBC NRBC COR # BLD: 7.48 10*3/MM3 (ref 3.4–10.8)

## 2023-06-08 PROCEDURE — 83880 ASSAY OF NATRIURETIC PEPTIDE: CPT

## 2023-06-08 PROCEDURE — 83735 ASSAY OF MAGNESIUM: CPT

## 2023-06-08 PROCEDURE — 82728 ASSAY OF FERRITIN: CPT

## 2023-06-08 PROCEDURE — 84466 ASSAY OF TRANSFERRIN: CPT

## 2023-06-08 PROCEDURE — 82306 VITAMIN D 25 HYDROXY: CPT

## 2023-06-08 PROCEDURE — 80050 GENERAL HEALTH PANEL: CPT

## 2023-06-08 PROCEDURE — 80061 LIPID PANEL: CPT

## 2023-06-08 PROCEDURE — 83540 ASSAY OF IRON: CPT

## 2023-06-08 PROCEDURE — 36415 COLL VENOUS BLD VENIPUNCTURE: CPT

## 2023-06-12 ENCOUNTER — OFFICE VISIT (OUTPATIENT)
Dept: CARDIOLOGY | Facility: CLINIC | Age: 78
End: 2023-06-12
Payer: COMMERCIAL

## 2023-06-12 VITALS
HEIGHT: 70 IN | WEIGHT: 186 LBS | DIASTOLIC BLOOD PRESSURE: 66 MMHG | BODY MASS INDEX: 26.63 KG/M2 | HEART RATE: 60 BPM | SYSTOLIC BLOOD PRESSURE: 125 MMHG

## 2023-06-12 DIAGNOSIS — Z00.00 MEDICARE ANNUAL WELLNESS VISIT, SUBSEQUENT: ICD-10-CM

## 2023-06-12 DIAGNOSIS — I10 ESSENTIAL HYPERTENSION: Chronic | ICD-10-CM

## 2023-06-12 DIAGNOSIS — R73.01 IMPAIRED FASTING GLUCOSE: ICD-10-CM

## 2023-06-12 DIAGNOSIS — E78.2 MIXED HYPERLIPIDEMIA: Chronic | ICD-10-CM

## 2023-06-12 DIAGNOSIS — D50.9 IRON DEFICIENCY ANEMIA, UNSPECIFIED IRON DEFICIENCY ANEMIA TYPE: Primary | ICD-10-CM

## 2023-06-12 DIAGNOSIS — I25.810 CORONARY ARTERY DISEASE INVOLVING CORONARY BYPASS GRAFT OF NATIVE HEART WITHOUT ANGINA PECTORIS: Primary | Chronic | ICD-10-CM

## 2023-06-12 PROCEDURE — 3074F SYST BP LT 130 MM HG: CPT | Performed by: INTERNAL MEDICINE

## 2023-06-12 PROCEDURE — 99214 OFFICE O/P EST MOD 30 MIN: CPT | Performed by: INTERNAL MEDICINE

## 2023-06-12 PROCEDURE — 1160F RVW MEDS BY RX/DR IN RCRD: CPT | Performed by: INTERNAL MEDICINE

## 2023-06-12 PROCEDURE — 3078F DIAST BP <80 MM HG: CPT | Performed by: INTERNAL MEDICINE

## 2023-06-12 PROCEDURE — 1159F MED LIST DOCD IN RCRD: CPT | Performed by: INTERNAL MEDICINE

## 2023-06-12 RX ORDER — NITROGLYCERIN 0.4 MG/1
0.4 TABLET SUBLINGUAL
Qty: 25 TABLET | Refills: 1 | Status: SHIPPED | OUTPATIENT
Start: 2023-06-12

## 2023-06-12 RX ORDER — CARVEDILOL 3.12 MG/1
3.12 TABLET ORAL EVERY EVENING
Qty: 90 TABLET | Refills: 3 | Status: SHIPPED | OUTPATIENT
Start: 2023-06-12

## 2023-06-12 RX ORDER — TAMSULOSIN HYDROCHLORIDE 0.4 MG/1
1 CAPSULE ORAL DAILY
COMMUNITY

## 2023-06-12 RX ORDER — ASPIRIN 81 MG/1
81 TABLET ORAL DAILY
Qty: 90 TABLET | Refills: 3 | Status: SHIPPED | OUTPATIENT
Start: 2023-06-12

## 2023-06-20 NOTE — PROGRESS NOTES
CARDIOLOGY FOLLOW-UP PROGRESS NOTE        Chief Complaint  Follow-up and Coronary Artery Disease    Subjective            Derek Castelan presents to [facility] CARDIOLOGY  History of Present Illness    Mr Castelan is here for routine 9-month follow-up visit.  He was previously following up with Dr. Chao, who recently moved to heart failure clinic.  He was last seen in cardiology office on 9/12/2022.  During last office visit the dose of Zetia was decreased to half tablet daily due to various side effects.  However he is not currently taking the medication.  He is taking all other medicines as prescribed.  He had a recent episodes of blood in urine, also noted to have urothelial neoplasm.  He had recent urological procedures, Brilinta was held for the procedure.  He is currently back on it.    Past History:    Coronary artery disease s/p coronary artery bypass grafting in July 2007 ( LIMA to diagonal 1 and the LAD, SVG to OM1 and OM2, and SVG to sequential graft to the RCA and PDA).  Non-STEMI in 2016.  Cardiac cath showed patent LIMA to LAD and SVG to RCA.  SVG to OM1 and OM 2 was filled with thrombus after the initial attachment.  Native left circumflex was artery had a 60% stenosis.  Native RCA is 100% occluded.  Medical management was opted with Brilinta.    Essential hypertension  Mixed hyperlipidemia  Ischemic cardiomyopathy : Most recent LV ejection fraction is 50% with mild inferior wall hypokinesis    Medical History:  Past Medical History:   Diagnosis Date    Bladder mass 04/08/2020    Calculus of kidney     Cancer     Bladder Cancer    CHF (congestive heart failure) 2007    Coronary artery disease involving coronary bypass graft of native heart without angina pectoris 07/22/2021    with previous bypass surgery (July 2007 x6, LIMA to diagonal 1 and the LAD, SVG to OM1 and OM2, and SVG to sequential graft to the RCA and PDA); Previous myocardial infarction (even when on clopidogrel     Essential hypertension 07/22/2021    Hematuria 04/08/2020    Hypothyroidism     Mixed hyperlipidemia 08/01/2021    LDL was 70 in July 2021.  Patient is maintained on high-dose Crestor 40 mg, he is tolerating this, benefiting from it, and should continue same.    Myocardial infarction 2007       Surgical History: has a past surgical history that includes Hernia repair (2001); Cardiac surgery (2007); Skin cancer excision; and Bladder tumor excision.     Family History: family history includes Cancer in his mother and sister; Diabetes in his mother; Heart disease in his brother, brother, and mother; Hypertension in his mother; Vision loss in his mother.     Social History: reports that he has never smoked. He has never used smokeless tobacco. He reports that he does not drink alcohol and does not use drugs.    Allergies: Patient has no known allergies.    Current Outpatient Medications on File Prior to Visit   Medication Sig    Brilinta 60 MG tablet tablet Take 1 tablet by mouth 2 (Two) Times a Day.    docusate sodium (COLACE) 100 MG capsule Take 1 capsule by mouth 2 (Two) Times a Day.    FeroSul 325 (65 Fe) MG tablet TAKE ONE TABLET BY MOUTH EVERY OTHER DAY    levothyroxine (SYNTHROID, LEVOTHROID) 88 MCG tablet Take 1 tablet by mouth Daily.    nitroglycerin (NITRODUR) 0.2 MG/HR patch APPLY ONE PATCH TO THE SKIN EVERY MORNING AND REMOVE IN THE EVENING    rosuvastatin (CRESTOR) 40 MG tablet TAKE ONE TABLET BY MOUTH DAILY    tamsulosin (FLOMAX) 0.4 MG capsule 24 hr capsule Take 1 capsule by mouth Daily.     No current facility-administered medications on file prior to visit.          Review of Systems   Respiratory:  Negative for cough, shortness of breath and wheezing.    Cardiovascular:  Negative for chest pain, palpitations and leg swelling.   Gastrointestinal:  Negative for nausea and vomiting.   Genitourinary:  Positive for hematuria.   Neurological:  Negative for dizziness and syncope.      Objective     /66   " Pulse 60   Ht 177.8 cm (70\")   Wt 84.4 kg (186 lb)   BMI 26.69 kg/m²       Physical Exam    General : Alert, awake, no acute distress  Neck : Supple, no carotid bruit, no jugular venous distention  CVS : Regular rate and rhythm, no murmur, rubs or gallops  Lungs: Clear to auscultation bilaterally, no crackles or rhonchi  Abdomen: Soft, nontender, bowel sounds heard in all 4 quadrants  Extremities: Warm, well-perfused, no pedal edema    Result Review :     The following data was reviewed by: Derek Bae MD on 06/12/2023:    CMP          1/20/2023    09:27 4/6/2023    11:32 6/8/2023    10:42   CMP   Glucose 91   101    BUN 22   15    Creatinine 1.15  1.30  1.17    EGFR 66.4  57.3  65.0    Sodium 142   136    Potassium 4.3   4.0    Chloride 106   104    Calcium 9.7   9.1    Total Protein 7.2   6.4    Albumin 4.3   3.5    Globulin 2.9   2.9    Total Bilirubin 0.5   0.7    Alkaline Phosphatase 74   75    AST (SGOT) 56   25    ALT (SGPT) 57   18    Albumin/Globulin Ratio 1.5   1.2    BUN/Creatinine Ratio 19.1   12.8    Anion Gap 10.1   10.7      CBC          9/8/2022    09:00 6/8/2023    10:42   CBC   WBC 8.29  7.48    RBC 4.08  3.14    Hemoglobin 12.9  9.8    Hematocrit 39.8  29.1    MCV 97.5  92.7    MCH 31.6  31.2    MCHC 32.4  33.7    RDW 13.0  13.3    Platelets 187  195      TSH          9/8/2022    09:00 6/8/2023    10:42   TSH   TSH 4.120  6.980      Lipid Panel          9/8/2022    09:00 1/20/2023    09:27 6/8/2023    10:42   Lipid Panel   Total Cholesterol 148  142  126    Triglycerides 88  66  72    HDL Cholesterol 54  63  43    VLDL Cholesterol 17  13  15    LDL Cholesterol  77  66  68    LDL/HDL Ratio 1.41  1.04  1.60           Data reviewed: Cardiology studies        Results for orders placed in visit on 02/20/23    Adult Transthoracic Echo Complete w/ Color, Spectral and Contrast if necessary per protocol    Interpretation Summary    Overall LV ejection fraction is 50% with moderate hypokinesis of " the basal to mid inferolateral wall, consistent with old myocardial infarction.    Left ventricular diastolic function is consistent with (grade I) impaired relaxation.    There is mild mitral regurgitation, which is eccentrically directed.    There is mild tricuspid regurgitation.  Estimated right ventricular systolic pressure from tricuspid regurgitation is normal (<35 mmHg).      Results for orders placed during the hospital encounter of 11/02/21    Stress Test With Myocardial Perfusion One Day    Interpretation Summary  · Left ventricular ejection fraction is mildly reduced. (Calculated EF = 41%).  · Findings consistent with a normal ECG stress test.  · Myocardial perfusion imaging indicates a medium-sized infarct located in the basal inferior lateral wall with mild amara-infarct ischemia.  · Impressions are consistent with an intermediate risk study.  · The current study reveals no changes compared to prior study of 2018               Assessment and Plan        Diagnoses and all orders for this visit:    1. Coronary artery disease involving coronary bypass graft of native heart without angina pectoris (Primary)  Assessment & Plan:  He is currently stable with no anginal-like symptoms.  LV ejection fraction is 50%.  Continue aspirin, Brilinta, statin, beta-blocker and nitroglycerin patch.  We will continue Brilinta indefinitely due to recurrent thrombus formation in vein grafts, given while he was on Plavix.  There is a recent drop in hemoglobin to 9.8 per labs done earlier this month and he had episodes of hematuria.  Symptoms currently subsided.  We will continue to monitor closely.    Orders:  -     carvedilol (COREG) 3.125 MG tablet; Take 1 tablet by mouth Every Evening.  Dispense: 90 tablet; Refill: 3  -     nitroglycerin (NITROSTAT) 0.4 MG SL tablet; Place 1 tablet under the tongue Every 5 (Five) Minutes As Needed for Chest Pain. Take no more than 3 doses in 15 minutes.  Dispense: 25 tablet; Refill: 1  -      aspirin 81 MG EC tablet; Take 1 tablet by mouth Daily.  Dispense: 90 tablet; Refill: 3    2. Essential hypertension  Assessment & Plan:  Blood pressure very well controlled.  Continue carvedilol at the current dose.    Orders:  -     carvedilol (COREG) 3.125 MG tablet; Take 1 tablet by mouth Every Evening.  Dispense: 90 tablet; Refill: 3    3. Mixed hyperlipidemia  Assessment & Plan:  Most recent LDL is 66, which is at goal.  Continue rosuvastatin 40 mg nightly.  We will repeat the lipid panel before next visit.                Follow Up     Return in about 6 months (around 12/12/2023) for Next scheduled follow up.    Patient was given instructions and counseling regarding his condition or for health maintenance advice. Please see specific information pulled into the AVS if appropriate.

## 2023-06-20 NOTE — ASSESSMENT & PLAN NOTE
Most recent LDL is 66, which is at goal.  Continue rosuvastatin 40 mg nightly.  We will repeat the lipid panel before next visit.

## 2023-06-20 NOTE — ASSESSMENT & PLAN NOTE
He is currently stable with no anginal-like symptoms.  LV ejection fraction is 50%.  Continue aspirin, Brilinta, statin, beta-blocker and nitroglycerin patch.  We will continue Brilinta indefinitely due to recurrent thrombus formation in vein grafts, given while he was on Plavix.  There is a recent drop in hemoglobin to 9.8 per labs done earlier this month and he had episodes of hematuria.  Symptoms currently subsided.  We will continue to monitor closely.

## 2023-07-24 ENCOUNTER — TELEPHONE (OUTPATIENT)
Dept: CARDIOLOGY | Facility: CLINIC | Age: 78
End: 2023-07-24
Payer: COMMERCIAL

## 2023-07-24 NOTE — TELEPHONE ENCOUNTER
The [facility] received a fax that requires your attention. The document has been indexed to the patient’s chart for your review.      Reason for sending: EXTERNAL MEDICAL RECORD NOTIFICATION     Documents Description: SHIPMENT UPDATE     Name of Sender: [employer]     Date Indexed: 7.23.23

## 2023-07-27 ENCOUNTER — LAB (OUTPATIENT)
Dept: LAB | Facility: HOSPITAL | Age: 78
End: 2023-07-27
Payer: COMMERCIAL

## 2023-07-27 ENCOUNTER — TRANSCRIBE ORDERS (OUTPATIENT)
Dept: LAB | Facility: HOSPITAL | Age: 78
End: 2023-07-27
Payer: COMMERCIAL

## 2023-07-27 DIAGNOSIS — Z00.00 MEDICARE ANNUAL WELLNESS VISIT, SUBSEQUENT: ICD-10-CM

## 2023-07-27 DIAGNOSIS — Z11.59 NEED FOR HEPATITIS C SCREENING TEST: Primary | ICD-10-CM

## 2023-07-27 DIAGNOSIS — N17.9 ACUTE RENAL FAILURE, UNSPECIFIED ACUTE RENAL FAILURE TYPE: ICD-10-CM

## 2023-07-27 DIAGNOSIS — I25.810 CORONARY ARTERY DISEASE INVOLVING CORONARY BYPASS GRAFT OF NATIVE HEART WITHOUT ANGINA PECTORIS: Chronic | ICD-10-CM

## 2023-07-27 DIAGNOSIS — R73.01 IMPAIRED FASTING GLUCOSE: ICD-10-CM

## 2023-07-27 DIAGNOSIS — N17.9 ACUTE RENAL FAILURE, UNSPECIFIED ACUTE RENAL FAILURE TYPE: Primary | ICD-10-CM

## 2023-07-27 DIAGNOSIS — D50.9 IRON DEFICIENCY ANEMIA, UNSPECIFIED IRON DEFICIENCY ANEMIA TYPE: ICD-10-CM

## 2023-07-27 DIAGNOSIS — Z95.1 S/P CABG X 6: ICD-10-CM

## 2023-07-27 DIAGNOSIS — E78.2 MIXED HYPERLIPIDEMIA: Chronic | ICD-10-CM

## 2023-07-27 DIAGNOSIS — I10 ESSENTIAL HYPERTENSION: Chronic | ICD-10-CM

## 2023-07-27 LAB
ANION GAP SERPL CALCULATED.3IONS-SCNC: 10.1 MMOL/L (ref 5–15)
BACTERIA UR QL AUTO: ABNORMAL /HPF
BASOPHILS # BLD AUTO: 0.04 10*3/MM3 (ref 0–0.2)
BASOPHILS NFR BLD AUTO: 0.7 % (ref 0–1.5)
BILIRUB UR QL STRIP: NEGATIVE
BUN SERPL-MCNC: 24 MG/DL (ref 8–23)
BUN/CREAT SERPL: 12.6 (ref 7–25)
CALCIUM SPEC-SCNC: 9.3 MG/DL (ref 8.6–10.5)
CHLORIDE SERPL-SCNC: 106 MMOL/L (ref 98–107)
CLARITY UR: ABNORMAL
CO2 SERPL-SCNC: 23.9 MMOL/L (ref 22–29)
COLOR UR: YELLOW
CREAT SERPL-MCNC: 1.9 MG/DL (ref 0.76–1.27)
CREAT UR-MCNC: 113.6 MG/DL
DEPRECATED RDW RBC AUTO: 50.1 FL (ref 37–54)
EGFRCR SERPLBLD CKD-EPI 2021: 36.1 ML/MIN/1.73
EOSINOPHIL # BLD AUTO: 0.49 10*3/MM3 (ref 0–0.4)
EOSINOPHIL NFR BLD AUTO: 8.4 % (ref 0.3–6.2)
ERYTHROCYTE [DISTWIDTH] IN BLOOD BY AUTOMATED COUNT: 15 % (ref 12.3–15.4)
FERRITIN SERPL-MCNC: 169 NG/ML (ref 30–400)
GLUCOSE SERPL-MCNC: 92 MG/DL (ref 65–99)
GLUCOSE UR STRIP-MCNC: NEGATIVE MG/DL
HBA1C MFR BLD: 5.4 % (ref 4.8–5.6)
HCT VFR BLD AUTO: 31.3 % (ref 37.5–51)
HGB BLD-MCNC: 10.2 G/DL (ref 13–17.7)
HGB UR QL STRIP.AUTO: ABNORMAL
HYALINE CASTS UR QL AUTO: ABNORMAL /LPF
IMM GRANULOCYTES # BLD AUTO: 0.01 10*3/MM3 (ref 0–0.05)
IMM GRANULOCYTES NFR BLD AUTO: 0.2 % (ref 0–0.5)
IRON 24H UR-MRATE: 52 MCG/DL (ref 59–158)
IRON SATN MFR SERPL: 14 % (ref 20–50)
KETONES UR QL STRIP: NEGATIVE
LEUKOCYTE ESTERASE UR QL STRIP.AUTO: ABNORMAL
LYMPHOCYTES # BLD AUTO: 1.91 10*3/MM3 (ref 0.7–3.1)
LYMPHOCYTES NFR BLD AUTO: 32.8 % (ref 19.6–45.3)
MAGNESIUM SERPL-MCNC: 2.1 MG/DL (ref 1.6–2.4)
MCH RBC QN AUTO: 31.7 PG (ref 26.6–33)
MCHC RBC AUTO-ENTMCNC: 32.6 G/DL (ref 31.5–35.7)
MCV RBC AUTO: 97.2 FL (ref 79–97)
MONOCYTES # BLD AUTO: 0.47 10*3/MM3 (ref 0.1–0.9)
MONOCYTES NFR BLD AUTO: 8.1 % (ref 5–12)
NEUTROPHILS NFR BLD AUTO: 2.91 10*3/MM3 (ref 1.7–7)
NEUTROPHILS NFR BLD AUTO: 49.8 % (ref 42.7–76)
NITRITE UR QL STRIP: POSITIVE
NRBC BLD AUTO-RTO: 0 /100 WBC (ref 0–0.2)
PH UR STRIP.AUTO: 7 [PH] (ref 5–8)
PLATELET # BLD AUTO: 209 10*3/MM3 (ref 140–450)
PMV BLD AUTO: 11.1 FL (ref 6–12)
POTASSIUM SERPL-SCNC: 4.7 MMOL/L (ref 3.5–5.2)
PROT ?TM UR-MCNC: 27.4 MG/DL
PROT UR QL STRIP: ABNORMAL
PROT/CREAT UR: 0.24 MG/G{CREAT}
RBC # BLD AUTO: 3.22 10*6/MM3 (ref 4.14–5.8)
RBC # UR STRIP: ABNORMAL /HPF
REF LAB TEST METHOD: ABNORMAL
SODIUM SERPL-SCNC: 140 MMOL/L (ref 136–145)
SP GR UR STRIP: 1.02 (ref 1–1.03)
SQUAMOUS #/AREA URNS HPF: ABNORMAL /HPF
TIBC SERPL-MCNC: 371 MCG/DL (ref 298–536)
TRANSFERRIN SERPL-MCNC: 249 MG/DL (ref 200–360)
TSH SERPL DL<=0.05 MIU/L-ACNC: 2.75 UIU/ML (ref 0.27–4.2)
UROBILINOGEN UR QL STRIP: ABNORMAL
WBC # UR STRIP: ABNORMAL /HPF
WBC NRBC COR # BLD: 5.83 10*3/MM3 (ref 3.4–10.8)

## 2023-07-27 PROCEDURE — 82570 ASSAY OF URINE CREATININE: CPT

## 2023-07-27 PROCEDURE — 83540 ASSAY OF IRON: CPT

## 2023-07-27 PROCEDURE — 84156 ASSAY OF PROTEIN URINE: CPT

## 2023-07-27 PROCEDURE — 84443 ASSAY THYROID STIM HORMONE: CPT

## 2023-07-27 PROCEDURE — 84466 ASSAY OF TRANSFERRIN: CPT

## 2023-07-27 PROCEDURE — 83735 ASSAY OF MAGNESIUM: CPT

## 2023-07-27 PROCEDURE — 81001 URINALYSIS AUTO W/SCOPE: CPT

## 2023-07-27 PROCEDURE — 80048 BASIC METABOLIC PNL TOTAL CA: CPT

## 2023-07-27 PROCEDURE — 85025 COMPLETE CBC W/AUTO DIFF WBC: CPT

## 2023-07-27 PROCEDURE — 83036 HEMOGLOBIN GLYCOSYLATED A1C: CPT

## 2023-07-27 PROCEDURE — 36415 COLL VENOUS BLD VENIPUNCTURE: CPT

## 2023-07-27 PROCEDURE — 82728 ASSAY OF FERRITIN: CPT

## 2023-07-28 ENCOUNTER — TELEPHONE (OUTPATIENT)
Dept: CARDIOLOGY | Facility: CLINIC | Age: 78
End: 2023-07-28
Payer: COMMERCIAL

## 2023-07-28 NOTE — TELEPHONE ENCOUNTER
----- Message from ANDI Cox sent at 7/28/2023  1:04 PM EDT -----  His renal function is elevated, although it is not significantly different than what it was at discharge following his recent nephrectomy.  He had recent visit with nephrology, please fax these visits to them.  Rosi Valencia, NP   [id number]   [place] [address] [phone] (Work)   [phone] (Fax)

## 2023-08-02 ENCOUNTER — TRANSCRIBE ORDERS (OUTPATIENT)
Dept: LAB | Facility: HOSPITAL | Age: 78
End: 2023-08-02
Payer: COMMERCIAL

## 2023-08-02 ENCOUNTER — LAB (OUTPATIENT)
Dept: LAB | Facility: HOSPITAL | Age: 78
End: 2023-08-02
Payer: COMMERCIAL

## 2023-08-02 DIAGNOSIS — N39.0 URINARY TRACT INFECTION WITHOUT HEMATURIA, SITE UNSPECIFIED: ICD-10-CM

## 2023-08-02 DIAGNOSIS — N39.0 URINARY TRACT INFECTION WITHOUT HEMATURIA, SITE UNSPECIFIED: Primary | ICD-10-CM

## 2023-08-02 LAB
BACTERIA UR QL AUTO: ABNORMAL /HPF
BILIRUB UR QL STRIP: NEGATIVE
CLARITY UR: CLEAR
COLOR UR: YELLOW
GLUCOSE UR STRIP-MCNC: NEGATIVE MG/DL
HGB UR QL STRIP.AUTO: ABNORMAL
HYALINE CASTS UR QL AUTO: ABNORMAL /LPF
KETONES UR QL STRIP: NEGATIVE
LEUKOCYTE ESTERASE UR QL STRIP.AUTO: ABNORMAL
NITRITE UR QL STRIP: POSITIVE
PH UR STRIP.AUTO: 6 [PH] (ref 5–8)
PROT UR QL STRIP: ABNORMAL
RBC # UR STRIP: ABNORMAL /HPF
REF LAB TEST METHOD: ABNORMAL
SP GR UR STRIP: 1.02 (ref 1–1.03)
SQUAMOUS #/AREA URNS HPF: ABNORMAL /HPF
UROBILINOGEN UR QL STRIP: ABNORMAL
WBC # UR STRIP: ABNORMAL /HPF

## 2023-08-02 PROCEDURE — 81001 URINALYSIS AUTO W/SCOPE: CPT

## 2023-08-02 PROCEDURE — 87086 URINE CULTURE/COLONY COUNT: CPT

## 2023-08-03 ENCOUNTER — TELEPHONE (OUTPATIENT)
Dept: CARDIOLOGY | Facility: CLINIC | Age: 78
End: 2023-08-03
Payer: COMMERCIAL

## 2023-08-03 LAB — BACTERIA SPEC AEROBE CULT: NO GROWTH

## 2023-08-07 ENCOUNTER — TELEPHONE (OUTPATIENT)
Dept: INTERNAL MEDICINE | Facility: CLINIC | Age: 78
End: 2023-08-07
Payer: COMMERCIAL

## 2023-08-07 NOTE — TELEPHONE ENCOUNTER
Miranda Castelan- wife, would like for you to call her regarding her  at [phone]  *please advise

## 2023-08-08 NOTE — TELEPHONE ENCOUNTER
Urology called her yesterday and states that culture didn't grow anything. They did not treat. This is FYI, he sees you tomorrow.

## 2023-08-09 ENCOUNTER — OFFICE VISIT (OUTPATIENT)
Dept: INTERNAL MEDICINE | Facility: CLINIC | Age: 78
End: 2023-08-09
Payer: COMMERCIAL

## 2023-08-09 VITALS
OXYGEN SATURATION: 92 % | SYSTOLIC BLOOD PRESSURE: 122 MMHG | DIASTOLIC BLOOD PRESSURE: 82 MMHG | WEIGHT: 176 LBS | BODY MASS INDEX: 25.2 KG/M2 | TEMPERATURE: 97.8 F | HEIGHT: 70 IN | HEART RATE: 84 BPM

## 2023-08-09 DIAGNOSIS — Z09 HOSPITAL DISCHARGE FOLLOW-UP: ICD-10-CM

## 2023-08-09 DIAGNOSIS — E78.2 MIXED HYPERLIPIDEMIA: Chronic | ICD-10-CM

## 2023-08-09 DIAGNOSIS — D50.0 IRON DEFICIENCY ANEMIA DUE TO CHRONIC BLOOD LOSS: ICD-10-CM

## 2023-08-09 DIAGNOSIS — N18.32 STAGE 3B CHRONIC KIDNEY DISEASE: ICD-10-CM

## 2023-08-09 DIAGNOSIS — I25.810 CORONARY ARTERY DISEASE INVOLVING CORONARY BYPASS GRAFT OF NATIVE HEART WITHOUT ANGINA PECTORIS: Chronic | ICD-10-CM

## 2023-08-09 DIAGNOSIS — Z00.00 MEDICARE ANNUAL WELLNESS VISIT, SUBSEQUENT: Primary | ICD-10-CM

## 2023-08-09 DIAGNOSIS — E03.4 ATROPHY OF THYROID: ICD-10-CM

## 2023-08-09 DIAGNOSIS — I10 ESSENTIAL HYPERTENSION: Chronic | ICD-10-CM

## 2023-08-09 DIAGNOSIS — R73.01 IMPAIRED FASTING GLUCOSE: ICD-10-CM

## 2023-08-09 PROBLEM — R53.83 OTHER FATIGUE: Status: RESOLVED | Noted: 2021-08-03 | Resolved: 2023-08-09

## 2023-08-09 PROBLEM — N32.89 BLADDER MASS: Status: RESOLVED | Noted: 2020-04-08 | Resolved: 2023-08-09

## 2023-08-09 PROBLEM — U07.1 COVID-19: Status: RESOLVED | Noted: 2022-08-26 | Resolved: 2023-08-09

## 2023-08-09 PROBLEM — N20.0 KIDNEY STONE: Status: RESOLVED | Noted: 2021-07-22 | Resolved: 2023-08-09

## 2023-08-09 RX ORDER — ONDANSETRON 4 MG/1
4 TABLET, FILM COATED ORAL EVERY 8 HOURS PRN
COMMUNITY
Start: 2023-07-05 | End: 2023-08-09 | Stop reason: SDUPTHER

## 2023-08-09 RX ORDER — ONDANSETRON 4 MG/1
4 TABLET, FILM COATED ORAL EVERY 8 HOURS PRN
Qty: 18 TABLET | Refills: 3 | Status: SHIPPED | OUTPATIENT
Start: 2023-08-09

## 2023-08-09 NOTE — ASSESSMENT & PLAN NOTE
Patient was actually hospitalized back in June, was there for about 5 days, discharged on July 4.  He underwent the left nephroureterectomy by Dr. Lara, appropriate records, to include discharge summary etc. were reviewed.  Please see the individualized headings for the details.

## 2023-08-09 NOTE — PATIENT INSTRUCTIONS
To do in about a month, just call for those results.    2.  You also have nonfasting labs to do just a few days before his 3-month follow-up appointment.

## 2023-08-09 NOTE — ASSESSMENT & PLAN NOTE
AWV completed 8/23.  Patient remains active and dependent.  He is recovering from major intra-abdominal surgery presently = was hospitalized in 7/23.  No falls past year.  Information to be given regarding advance directive.

## 2023-08-09 NOTE — ASSESSMENT & PLAN NOTE
LDL only 68 as per his 6/23 cardiology labs.  He is stable to continue with full dose Crestor as written.

## 2023-08-09 NOTE — ASSESSMENT & PLAN NOTE
Blood pressure well-controlled as of his 8/23 office visit.  His pulse is 84.  He stable to continue with just low-dose carvedilol along with nitroglycerin patch.

## 2023-08-09 NOTE — ASSESSMENT & PLAN NOTE
Patient with no ischemic issues as of his 8/23 office visit.  He was cleared for the left nephroureterectomy by  and had no complications unaware of with the surgery.  He is maintained on low-dose nitroglycerin patch, low-dose carvedilol, and baby aspirin for antianginal benefit, continue same.  Additionally patient is on Brilinta, and during his 6 weeks of intravesical therapy, he is taking it 2 days on and 5 days off.

## 2023-08-09 NOTE — ASSESSMENT & PLAN NOTE
TSH was down from 4.1 to 2.7 as per his 7/23 labs.  This certainly is very appropriate, he is stable to continue with 88 mcg daily.

## 2023-08-09 NOTE — ASSESSMENT & PLAN NOTE
Patient being seen 8/23 postop from left nephroureterectomy per Dr. Lara.  He is currently status post treatment 1 of 6 with intravesical chemotherapy for this.  Appreciate their assistance/expertise.

## 2023-08-09 NOTE — PROGRESS NOTES
Chief Complaint  Hospital Follow Up Visit (Left kidney taken out and hernia repair. Pt is currently having chemo. Pt states fatigue. Lab follow up. )    Subjective          Derek Castelan presents to [facility]     History of present illness:  78-year-old male with underlying hypertension, hyperlipidemia, resultant coronary artery disease status post 6 vessel bypass in 2007, who was seen as a New Pt 8/21 and who is coming in now 2/23 nearly 6 months late for a 6-month appointment.  We we will go over his med list in detail, review any recent labs he may have had, and address any new concerns.      ---> pt here 8/23 for routine 6-month f/u as well as delayed hosp f/u:    Admitted   6/30/23  Discharged 7/4/23 ([facility])  ...was felt nephroureterectomy with possible adjuvant chemo or immunotherapy after was the optimum management. (Dr Lara)  Post op day 4:  - Cr continuing to improve  - Pt Hgb dropped to 7.9 form 8.9; repeat was 8.3; stable and considered ok for discharge; HR improved back down to 70s and BP stable  - oxygen 90% on RA; encouraged IS and will go home with IS. Pt to go home with pain, meds, bowel reg, levaquin for catheter, follow up next week for cystogram and catheter pull. Significant Findings: Cr 2.2 on d/c.    7/12/23 OV  - discussed at tumor board and decided he may be a good candidate for intravesical chemotherapy or BCG  - follow up in 3 months for cystoscopy for surveillance     Review of Systems   Constitutional:  Negative for appetite change and fever.   HENT:  Negative for congestion and ear pain.    Eyes:  Negative for blurred vision.   Respiratory:  Negative for cough, chest tightness, shortness of breath and wheezing.    Cardiovascular:  Negative for chest pain, palpitations and leg swelling.   Gastrointestinal:  Negative for abdominal pain.   Genitourinary:  Negative for difficulty urinating, dysuria and hematuria.   Musculoskeletal:  Negative for  "arthralgias and gait problem.   Skin:  Negative for skin lesions.   Neurological:  Negative for syncope, memory problem and confusion.   Psychiatric/Behavioral:  Negative for self-injury and depressed mood.      Objective   Vital Signs:   /82   Pulse 84   Temp 97.8 øF (36.6 øC)   Ht 177.8 cm (70\")   Wt 79.8 kg (176 lb)   SpO2 92%   BMI 25.25 kg/mý           Physical Exam  Vitals and nursing note reviewed.   Constitutional:       General: He is not in acute distress.     Appearance: Normal appearance. He is not toxic-appearing.   HENT:      Head: Atraumatic.      Right Ear: External ear normal.      Left Ear: External ear normal.      Nose: Nose normal.      Mouth/Throat:      Mouth: Mucous membranes are moist.   Eyes:      General:         Right eye: No discharge.         Left eye: No discharge.      Extraocular Movements: Extraocular movements intact.      Pupils: Pupils are equal, round, and reactive to light.   Neck:      Comments: No carotid bruit.  Cardiovascular:      Rate and Rhythm: Normal rate and regular rhythm.      Pulses: Normal pulses.      Heart sounds: Normal heart sounds. No murmur heard.    No gallop.      Comments: Heart tones normal, no ectopy, no S3.  Pulmonary:      Effort: Pulmonary effort is normal. No respiratory distress.      Breath sounds: No wheezing, rhonchi or rales.      Comments: Lung fields clear bilaterally.  Abdominal:      General: There is no distension.      Palpations: Abdomen is soft. There is no mass.      Tenderness: There is no abdominal tenderness. There is no guarding.   Musculoskeletal:         General: No swelling or tenderness.      Cervical back: No tenderness.      Right lower leg: No edema.      Left lower leg: No edema.      Comments: No edema.   Skin:     General: Skin is warm and dry.      Findings: No rash.   Neurological:      General: No focal deficit present.      Mental Status: He is alert and oriented to person, place, and time. Mental status " is at baseline.      Motor: No weakness.      Gait: Gait normal.   Psychiatric:         Mood and Affect: Mood normal.         Thought Content: Thought content normal.        Result Review :   The following data was reviewed by: Richard Millan MD on 08/03/2021:                  Assessment and Plan    Diagnoses and all orders for this visit:    1. Medicare annual wellness visit, subsequent (Primary)  Assessment & Plan:  AWV completed 8/23.  Patient remains active and dependent.  He is recovering from major intra-abdominal surgery presently = was hospitalized in 7/23.  No falls past year.  Information to be given regarding advance directive.    Orders:  -     Hepatitis C antibody; Future    2. Hospital discharge follow-up  Assessment & Plan:  Patient was actually hospitalized back in June, was there for about 5 days, discharged on July 4.  He underwent the left nephroureterectomy by Dr. Lara, appropriate records, to include discharge summary etc. were reviewed.  Please see the individualized headings for the details.      3. Essential hypertension  Assessment & Plan:  Blood pressure well-controlled as of his 8/23 office visit.  His pulse is 84.  He stable to continue with just low-dose carvedilol along with nitroglycerin patch.    Orders:  -     Basic Metabolic Panel; Future    4. Atrophy of thyroid  Assessment & Plan:  TSH was down from 4.1 to 2.7 as per his 7/23 labs.  This certainly is very appropriate, he is stable to continue with 88 mcg daily.    Orders:  -     TSH; Future    5. Stage 3b chronic kidney disease  Assessment & Plan:  This is new as of his 8/23 OV.  GFR was previously above 60, about 2 weeks ago it was 36, this is since his left nephroureterectomy.  Patient does not require any changes to his medication secondary to this, but we certainly want to follow-up on laboratory studies, hopefully will continue to recover some.  Additionally discussed with patient to keep hydrated and avoid nonsteroidals  other than Tylenol.    Orders:  -     Renal Function Panel; Future    6. Iron deficiency anemia due to chronic blood loss  Assessment & Plan:  Patient's hemoglobin dropped to 7.9 postop.  As per his 7/27/2023 labs, his hemoglobin had recovered some to 10.2, iron studies were still on the low side, so patient should continue with over-the-counter iron supplementation.    Of note, during the past 2 months patient received a total of 4 units of packed cells due to blood loss from the bladder and then the left nephroureterectomy.    Orders:  -     Ferritin; Future  -     CBC & Differential; Future  -     Iron Profile; Future    7. Coronary artery disease involving coronary bypass graft of native heart without angina pectoris  Overview:  Coronary artery disease s/p coronary artery bypass grafting in July 2007 ( LIMA to diagonal 1 and the LAD, SVG to OM1 and OM2, and SVG to sequential graft to the RCA and PDA).  Non-STEMI in 2016.  Cardiac cath showed patent LIMA to LAD and SVG to RCA.  SVG to OM1 and OM 2 was filled with thrombus after the initial attachment.  Native left circumflex was artery had a 60% stenosis.  Native RCA is 100% occluded.  Medical management was opted with Brilinta.    Most recent SPECT study in 2021, negative for ischemia.  LV ejection fraction 50% per echocardiogram in 2023.    Assessment & Plan:  Patient with no ischemic issues as of his 8/23 office visit.  He was cleared for the left nephroureterectomy by  and had no complications unaware of with the surgery.  He is maintained on low-dose nitroglycerin patch, low-dose carvedilol, and baby aspirin for antianginal benefit, continue same.  Additionally patient is on Brilinta, and during his 6 weeks of intravesical therapy, he is taking it 2 days on and 5 days off.      8. Mixed hyperlipidemia  Assessment & Plan:  LDL only 68 as per his 6/23 cardiology labs.  He is stable to continue with full dose Crestor as written.      9. Impaired  fasting glucose  -     Hemoglobin A1c; Future    Other orders  -     ondansetron (ZOFRAN) 4 MG tablet; Take 1 tablet by mouth Every 8 (Eight) Hours As Needed for Nausea.  Dispense: 18 tablet; Refill: 3           CARDIOLOGY OV 1/4/21:    Derek Castelan is a 75 year old /White male who denies any chest pain or pressure. No palpitations, shortness of breath, swelling, dizziness, syncope, PND, or orthopnea. Cardiac-wise, he is feeling very well, but he has gained 8 pounds since his last visit. He has had bladder cancer and surgery since he was last here, but no chemotherapy. His blood pressures are monitored at home, but he did not bring his readings and says they are in good range. 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   PAST MEDICAL HISTORY: Coronary artery disease with previous bypass surgery (July 2007 x6, LIMA to diagonal 1 and the LAD, SVG to OM1 and OM2, and SVG to sequential graft to the RCA and PDA); Previous myocardial infarction (even when on clopidogrel); Hyperlipidemia; Hypertension; Hypothyroidism.   PSYCHOSOCIAL HISTORY: Denies tobacco use. Denies alcohol use.   CURRENT MEDICATIONS: Brilinta 60 mg b.i.d. long-term; carvedilol 3.125 mg q. p.m.; levothyroxine 88 mcg q. a.m.; rosuvastatin 40 mg q. p.m.; NitroPatch 0.2 mg/hour; aspirin 81 mg daily; nitroglycerin 0.4 mg p.r.n.; Centrum Silver.       ALLERGIES:  No known drug allergies.     1.  Hypertension, uncertain control.  2.  Coronary artery disease with previous MI and bypass without angina.  3.  Hyperlipidemia, at goal.        Plan      1.  Do a blood pressure log, and we will adjust hypertensive medications if needed.    2.  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    3.  Continue Brilinta long-term.  4.  Continue carvedilol for his hypertension.  5.  Continue rosuvastatin for his cholesterols.  6.  Follow up in 6 months with labs and an EKG or  earlier if needed.          UROLOGY 5/7/2020:    High Grade TI urothelial cancer. I have discussed the management of high grade T1 urothelial cancer of the bladder with the patient, including a 25-40% likelihood of detecting T2 or greater disease at re-resection. I have discussed the risks and prognosis of High grade TI disease with the patient including progression to MIBC and recurrence of HGTI. I have discussed bladder preserving management with BCG and intravesical agents, and the risks of infection, bladder screening, recurrence and progression, as well as the need for maintenance with BCG. I have discussed the role of early cystectomy for patients with HGTI and the excellent overall survival for patient with HGT1 bladder cancer who have a cystectomy. I mentioned that even with HGT1 there is a 10-15% chance of being discovered to have salome metastasis.      Recent pathology was HGT1 and discussed with patient that guidelines dictate re-resection in about 6 weeks, ensuring adequate healing prior- aware that Brillinta will have to be held again.  Will plan for repeat bilateral retrogrades at that time given history of bilateral hydronephrosis at time of hematuria.   Given the above limitations visually at time of resection as above and per operative note, will discuss with pathology if frozen sections at time of re-resection is possible if needed     Follow Up   Return in about 3 months (around 11/9/2023).  Patient was given instructions and counseling regarding his condition or for health maintenance advice. Please see specific information pulled into the AVS if appropriate.

## 2023-08-09 NOTE — ASSESSMENT & PLAN NOTE
Patient's hemoglobin dropped to 7.9 postop.  As per his 7/27/2023 labs, his hemoglobin had recovered some to 10.2, iron studies were still on the low side, so patient should continue with over-the-counter iron supplementation.    Of note, during the past 2 months patient received a total of 4 units of packed cells due to blood loss from the bladder and then the left nephroureterectomy.

## 2023-08-09 NOTE — PROGRESS NOTES
The ABCs of the Annual Wellness Visit  Subsequent Medicare Wellness Visit    Subjective      Derek Castelan is a 78 y.o. male who presents for a Subsequent Medicare Wellness Visit.    The following portions of the patient's history were reviewed and   updated as appropriate: allergies, current medications, past family history, past medical history, past social history, past surgical history, and problem list.    Compared to one year ago, the patient feels his physical   health is worse.---> Due to recent surgery.    Compared to one year ago, the patient feels his mental   health is the same.    Recent Hospitalizations:  He was admitted within the past 365 days at [facility] in 7/23 for Left nephroureterectomy.      Current Medical Providers:  Patient Care Team:  Richard Millan MD as PCP - General (Internal Medicine)  Derek Bae MD as Consulting Physician (Cardiology)  Rosi Valencia APRN (Nurse Practitioner)  Ankur Lara MD as Consulting Physician (Urology)    Outpatient Medications Prior to Visit   Medication Sig Dispense Refill    aspirin 81 MG EC tablet Take 1 tablet by mouth Daily. 90 tablet 3    Brilinta 60 MG tablet tablet Take 1 tablet by mouth 2 (Two) Times a Day. 180 tablet 3    carvedilol (COREG) 3.125 MG tablet Take 1 tablet by mouth Every Evening. 90 tablet 3    docusate sodium (COLACE) 100 MG capsule Take 1 capsule by mouth 2 (Two) Times a Day.      FeroSul 325 (65 Fe) MG tablet TAKE ONE TABLET BY MOUTH EVERY OTHER DAY 45 tablet 1    levothyroxine (SYNTHROID, LEVOTHROID) 88 MCG tablet Take 1 tablet by mouth Daily. 90 tablet 3    nitroglycerin (NITRODUR) 0.2 MG/HR patch APPLY ONE PATCH TO THE SKIN EVERY MORNING AND REMOVE IN THE EVENING 90 patch 1    nitroglycerin (NITROSTAT) 0.4 MG SL tablet Place 1 tablet under the tongue Every 5 (Five) Minutes As Needed for Chest Pain. Take no more than 3 doses in 15 minutes. 25 tablet 1    ondansetron (ZOFRAN) 4 MG tablet Take 1 tablet by  "mouth Every 8 (Eight) Hours As Needed.      rosuvastatin (CRESTOR) 40 MG tablet TAKE ONE TABLET BY MOUTH DAILY 90 tablet 2    tamsulosin (FLOMAX) 0.4 MG capsule 24 hr capsule Take 1 capsule by mouth Daily. (Patient not taking: Reported on 8/9/2023)       No facility-administered medications prior to visit.       No opioid medication identified on active medication list. I have reviewed chart for other potential  high risk medication/s and harmful drug interactions in the elderly.        Aspirin is on active medication list. Aspirin use is indicated based on review of current medical condition/s. Pros and cons of this therapy have been discussed today. Benefits of this medication outweigh potential harm.  Patient has been encouraged to continue taking this medication.  .      Patient Active Problem List   Diagnosis    Coronary artery disease involving coronary bypass graft of native heart without angina pectoris    Essential hypertension    Mixed hyperlipidemia    Atrophy of thyroid    S/P CABG x 6    Urothelial carcinoma with high risk of recurrence    Iron deficiency anemia due to chronic blood loss    Vitamin B12 deficiency    Medicare annual wellness visit, subsequent    Hospital discharge follow-up    Stage 3b chronic kidney disease     Advance Care Planning   Advance Care Planning     Advance Directive is not on file.  ACP discussion was held with the patient during this visit. Patient does not have an advance directive, information provided.     Objective    Vitals:    08/09/23 1502   BP: 122/82   Pulse: 84   Temp: 97.8 øF (36.6 øC)   SpO2: 92%   Weight: 79.8 kg (176 lb)   Height: 177.8 cm (70\")     Estimated body mass index is 25.25 kg/mý as calculated from the following:    Height as of this encounter: 177.8 cm (70\").    Weight as of this encounter: 79.8 kg (176 lb).    BMI is >= 25 and <30. (Overweight) The following options were offered after discussion;: none (medical contraindication)      Does the " patient have evidence of cognitive impairment?   No    Lab Results   Component Value Date    TRIG 72 2023    HDL 43 2023    LDL 68 2023    VLDL 15 2023    HGBA1C 5.40 2023          HEALTH RISK ASSESSMENT    Smoking Status:  Social History     Tobacco Use   Smoking Status Never   Smokeless Tobacco Never     Alcohol Consumption:  Social History     Substance and Sexual Activity   Alcohol Use Never     Fall Risk Screen:    STEADI Fall Risk Assessment was completed, and patient is at LOW risk for falls.Assessment completed on:2023    Depression Screenin/9/2023     3:00 PM   PHQ-2/PHQ-9 Depression Screening   Little Interest or Pleasure in Doing Things 0-->not at all   Feeling Down, Depressed or Hopeless 0-->not at all   PHQ-9: Brief Depression Severity Measure Score 0       Health Habits and Functional and Cognitive Screenin/9/2023     3:00 PM   Functional & Cognitive Status   Do you have difficulty preparing food and eating? No   Do you have difficulty bathing yourself, getting dressed or grooming yourself? No   Do you have difficulty using the toilet? No   Do you have difficulty moving around from place to place? No   Do you have trouble with steps or getting out of a bed or a chair? No   Current Diet Well Balanced Diet   Do you need help using the phone?  No   Are you deaf or do you have serious difficulty hearing?  No   Do you need help to go to places out of walking distance? No   Do you need help shopping? No   Do you need help preparing meals?  No   Do you need help with housework?  No   Do you need help with laundry? No   Do you need help taking your medications? No   Do you need help managing money? No   Do you ever drive or ride in a car without wearing a seat belt? No   Do you have difficulty concentrating, remembering or making decisions? No       Age-appropriate Screening Schedule:  Refer to the list below for future screening recommendations based on  patient's age, sex and/or medical conditions. Orders for these recommended tests are listed in the plan section. The patient has been provided with a written plan.    Health Maintenance   Topic Date Due    ZOSTER VACCINE (1 of 2) Never done    Pneumococcal Vaccine 65+ (2 - PCV) 08/29/2015    HEPATITIS C SCREENING  Never done    COVID-19 Vaccine (4 - Moderna series) 01/13/2022    TDAP/TD VACCINES (2 - Td or Tdap) 07/01/2023    INFLUENZA VACCINE  10/01/2023    LIPID PANEL  06/08/2024    ANNUAL WELLNESS VISIT  08/09/2024                  CMS Preventative Services Quick Reference  Risk Factors Identified During Encounter:    None Identified    The above risks/problems have been discussed with the patient.  Pertinent information has been shared with the patient in the After Visit Summary.    Diagnoses and all orders for this visit:    1. Medicare annual wellness visit, subsequent (Primary)  Assessment & Plan:  AWV completed 8/23.  Patient remains active and dependent.  He is recovering from major intra-abdominal surgery presently = was hospitalized in 7/23.  No falls past year.  Information to be given regarding advance directive.      2. Hospital discharge follow-up  Assessment & Plan:  Patient was actually hospitalized back in June, was there for about 5 days, discharged on July 4.  He underwent the left nephroureterectomy by Dr. Lara, appropriate records, to include discharge summary etc. were reviewed.  Please see the individualized headings for the details.      3. Essential hypertension  Assessment & Plan:  Blood pressure well-controlled as of his 8/23 office visit.  His pulse is 84.  He stable to continue with just low-dose carvedilol along with nitroglycerin patch.      4. Atrophy of thyroid  Assessment & Plan:  TSH was down from 4.1 to 2.7 as per his 7/23 labs.  This certainly is very appropriate, he is stable to continue with 88 mcg daily.      5. Stage 3b chronic kidney disease  Assessment & Plan:  This is  new as of his 8/23 OV.  GFR was previously above 60, about 2 weeks ago it was 36, this is since his left nephroureterectomy.  Patient does not require any changes to his medication secondary to this, but we certainly want to follow-up on laboratory studies, hopefully will continue to recover some.  Additionally discussed with patient to keep hydrated and avoid nonsteroidals other than Tylenol.      6. Iron deficiency anemia due to chronic blood loss  Assessment & Plan:  Patient's hemoglobin dropped to 7.9 postop.  As per his 7/27/2023 labs, his hemoglobin had recovered some to 10.2, iron studies were still on the low side, so patient should continue with over-the-counter iron supplementation.      7. Coronary artery disease involving coronary bypass graft of native heart without angina pectoris  Assessment & Plan:  Patient with no ischemic issues as of his 8/23 office visit.  He was cleared for the left nephroureterectomy by  and had no complications unaware of with the surgery.  He is maintained on low-dose nitroglycerin patch, low-dose carvedilol, and baby aspirin for antianginal benefit, continue same.  Additionally patient is on Brilinta, and during his 6 weeks of intravesical therapy, he is taking it 2 days on and 5 days off.      8. Mixed hyperlipidemia  Assessment & Plan:  LDL only 68 as per his 6/23 cardiology labs.  He is stable to continue with full dose Crestor as written.          Follow Up:   Next Medicare Wellness visit to be scheduled in 1 year.      An After Visit Summary and PPPS were made available to the patient.

## 2023-08-09 NOTE — ASSESSMENT & PLAN NOTE
This is new as of his 8/23 OV.  GFR was previously above 60, about 2 weeks ago it was 36, this is since his left nephroureterectomy.  Patient does not require any changes to his medication secondary to this, but we certainly want to follow-up on laboratory studies, hopefully will continue to recover some.  Additionally discussed with patient to keep hydrated and avoid nonsteroidals other than Tylenol.

## 2023-08-11 ENCOUNTER — TELEPHONE (OUTPATIENT)
Dept: CARDIOLOGY | Facility: CLINIC | Age: 78
End: 2023-08-11
Payer: COMMERCIAL

## 2023-08-11 NOTE — TELEPHONE ENCOUNTER
[facility] received a fax that requires your attention. The document has been indexed to the patient's chart for your review.      Reason for sending: EXTERNAL MEDICAL RECORD NOTIFICATION     Documents Description: SHIPMENT NOTIFICATION     Name of Sender: [employer]     Date Indexed: 8.10.23

## 2023-09-08 ENCOUNTER — LAB (OUTPATIENT)
Dept: LAB | Facility: HOSPITAL | Age: 78
End: 2023-09-08
Payer: COMMERCIAL

## 2023-09-08 DIAGNOSIS — N18.32 STAGE 3B CHRONIC KIDNEY DISEASE: ICD-10-CM

## 2023-09-08 LAB
ALBUMIN SERPL-MCNC: 3.7 G/DL (ref 3.5–5.2)
ANION GAP SERPL CALCULATED.3IONS-SCNC: 8 MMOL/L (ref 5–15)
BUN SERPL-MCNC: 28 MG/DL (ref 8–23)
BUN/CREAT SERPL: 14.2 (ref 7–25)
CALCIUM SPEC-SCNC: 9.1 MG/DL (ref 8.6–10.5)
CHLORIDE SERPL-SCNC: 105 MMOL/L (ref 98–107)
CO2 SERPL-SCNC: 23 MMOL/L (ref 22–29)
CREAT SERPL-MCNC: 1.97 MG/DL (ref 0.76–1.27)
EGFRCR SERPLBLD CKD-EPI 2021: 34.6 ML/MIN/1.73
GLUCOSE SERPL-MCNC: 83 MG/DL (ref 65–99)
PHOSPHATE SERPL-MCNC: 4.5 MG/DL (ref 2.5–4.5)
POTASSIUM SERPL-SCNC: 5 MMOL/L (ref 3.5–5.2)
SODIUM SERPL-SCNC: 136 MMOL/L (ref 136–145)

## 2023-09-08 PROCEDURE — 36415 COLL VENOUS BLD VENIPUNCTURE: CPT

## 2023-09-08 PROCEDURE — 80069 RENAL FUNCTION PANEL: CPT

## 2023-10-16 RX ORDER — FERROUS SULFATE 325(65) MG
1 TABLET ORAL EVERY OTHER DAY
Qty: 45 TABLET | Refills: 1 | Status: SHIPPED | OUTPATIENT
Start: 2023-10-16

## 2023-11-01 RX ORDER — LEVOTHYROXINE SODIUM 88 UG/1
88 TABLET ORAL DAILY
Qty: 90 TABLET | Refills: 3 | Status: SHIPPED | OUTPATIENT
Start: 2023-11-01

## 2023-12-07 ENCOUNTER — OFFICE VISIT (OUTPATIENT)
Dept: CARDIOLOGY | Facility: CLINIC | Age: 78
End: 2023-12-07
Payer: MEDICARE

## 2023-12-07 VITALS
WEIGHT: 187.8 LBS | SYSTOLIC BLOOD PRESSURE: 139 MMHG | DIASTOLIC BLOOD PRESSURE: 82 MMHG | BODY MASS INDEX: 26.88 KG/M2 | HEIGHT: 70 IN | HEART RATE: 56 BPM

## 2023-12-07 DIAGNOSIS — I10 ESSENTIAL HYPERTENSION: ICD-10-CM

## 2023-12-07 DIAGNOSIS — E78.2 MIXED HYPERLIPIDEMIA: ICD-10-CM

## 2023-12-07 DIAGNOSIS — I25.810 CORONARY ARTERY DISEASE INVOLVING CORONARY BYPASS GRAFT OF NATIVE HEART WITHOUT ANGINA PECTORIS: Primary | ICD-10-CM

## 2023-12-07 NOTE — PROGRESS NOTES
Chief Complaint  Coronary Artery Disease and Follow-up    Subjective        History of Present Illness  Derek Castelan presents to [facility] CARDIOLOGY   Mr. Castelan is a 78-year-old male patient coming in today for routine cardiac follow-up.  He is currently by his wife.  Over the course of this year, he has been dealing with bladder cancer, involving the ureter, and suspected cancer of the kidney.  He had significant weight loss, hematuria and fatigue earlier this year.  He has since underwent surgery, required blood transfusions, and completed 6 weeks of intravesicular chemotherapy treatments.  He now is overall feeling well, and has been slowly gaining weight and having more energy.  He denies any episodes of chest pains, shortness of breath palpitations, lightheadedness or dizziness.   He did have abnormal renal studies following surgery which have not returned to his presurgical baseline, and is routinely following with nephrology.        Past History:     Coronary artery disease s/p coronary artery bypass grafting in July 2007 ( LIMA to diagonal 1 and the LAD, SVG to OM1 and OM2, and SVG to sequential graft to the RCA and PDA).  Non-STEMI in 2016.  Cardiac cath showed patent LIMA to LAD and SVG to RCA.  SVG to OM1 and OM 2 was filled with thrombus after the initial attachment.  Native left circumflex was artery had a 60% stenosis.  Native RCA is 100% occluded.  Medical management was opted with Brilinta.     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     Past Medical History:   Diagnosis Date    Bladder mass 04/08/2020    Calculus of kidney     Cancer     CHF (congestive heart failure) 2007    Coronary artery disease involving coronary bypass graft of native heart without angina pectoris 07/22/2021    Essential  hypertension 07/22/2021    Hematuria 04/08/2020    Hypothyroidism     Mixed hyperlipidemia 08/01/2021    Myocardial infarction 2007       No Known Allergies     Past Surgical History:   Procedure Laterality Date    BLADDER TUMOR EXCISION      CARDIAC SURGERY  2007    open heart    HERNIA REPAIR  2001    SKIN CANCER EXCISION      bascal cell        Social History  He  reports that he has never smoked. He has never used smokeless tobacco. He reports that he does not drink alcohol and does not use drugs.    Family History  His family history includes Cancer in his mother and sister; Diabetes in his mother; Heart disease in his brother, brother, and mother; Hypertension in his mother; Vision loss in his mother.       Current Outpatient Medications on File Prior to Visit   Medication Sig    aspirin 81 MG EC tablet Take 1 tablet by mouth Daily.    carvedilol (COREG) 3.125 MG tablet Take 1 tablet by mouth Every Evening.    docusate sodium (COLACE) 100 MG capsule Take 1 capsule by mouth 2 (Two) Times a Day.    FeroSul 325 (65 Fe) MG tablet TAKE 1 TABLET BY MOUTH EVERY OTHER DAY    levothyroxine (SYNTHROID, LEVOTHROID) 88 MCG tablet TAKE ONE TABLET BY MOUTH DAILY    nitroglycerin (NITRODUR) 0.2 MG/HR patch APPLY ONE PATCH TO THE SKIN EVERY MORNING AND REMOVE IN THE EVENING    nitroglycerin (NITROSTAT) 0.4 MG SL tablet Place 1 tablet under the tongue Every 5 (Five) Minutes As Needed for Chest Pain. Take no more than 3 doses in 15 minutes.    ondansetron (ZOFRAN) 4 MG tablet Take 1 tablet by mouth Every 8 (Eight) Hours As Needed for Nausea.    rosuvastatin (CRESTOR) 40 MG tablet TAKE ONE TABLET BY MOUTH DAILY    [DISCONTINUED] Brilinta 60 MG tablet tablet Take 1 tablet by mouth 2 (Two) Times a Day.     No current facility-administered medications on file prior to visit.         Review of Systems   Constitutional:  Positive for fatigue.   Respiratory:  Negative for cough, chest tightness and shortness of breath.   "  Cardiovascular:  Negative for chest pain, palpitations and leg swelling.   Gastrointestinal:  Negative for nausea and vomiting.   Neurological:  Negative for dizziness and syncope.   Hematological:  Does not bruise/bleed easily.        Objective   Vitals:    12/07/23 1054   BP: 139/82   Pulse: 56   Weight: 85.2 kg (187 lb 12.8 oz)   Height: 177.8 cm (70\")         Physical Exam  General : Alert, awake, no acute distress  Neck : Supple, no carotid bruit, no jugular venous distention  CVS : Regular rate and rhythm, no murmur, rubs or gallops  Lungs: Clear to auscultation bilaterally, no crackles or rhonchi  Abdomen: Soft, nontender, bowel sounds active  Extremities: Warm, well-perfused, no pedal edema      Result Review     The following data was reviewed by ANDI Cox  proBNP   Date Value Ref Range Status   06/08/2023 681.0 0.0 - 1,800.0 pg/mL Final     CMP          6/8/2023    10:42 7/27/2023    11:07 9/8/2023    11:31   CMP   Glucose 101  92  83    BUN 15  24  28    Creatinine 1.17  1.90  1.97    EGFR 65.0  36.1  34.6    Sodium 136  140  136    Potassium 4.0  4.7  5.0    Chloride 104  106  105    Calcium 9.1  9.3  9.1    Total Protein 6.4      Albumin 3.5   3.7    Globulin 2.9      Total Bilirubin 0.7      Alkaline Phosphatase 75      AST (SGOT) 25      ALT (SGPT) 18      Albumin/Globulin Ratio 1.2      BUN/Creatinine Ratio 12.8  12.6  14.2    Anion Gap 10.7  10.1  8.0      CBC w/diff          6/8/2023    10:42 7/27/2023    11:07   CBC w/Diff   WBC 7.48  5.83    RBC 3.14  3.22    Hemoglobin 9.8  10.2    Hematocrit 29.1  31.3    MCV 92.7  97.2    MCH 31.2  31.7    MCHC 33.7  32.6    RDW 13.3  15.0    Platelets 195  209    Neutrophil Rel % 67.5  49.8    Immature Granulocyte Rel % 0.3  0.2    Lymphocyte Rel % 17.6  32.8    Monocyte Rel % 9.8  8.1    Eosinophil Rel % 4.4  8.4    Basophil Rel % 0.4  0.7       Lab Results   Component Value Date    TSH 2.750 07/27/2023      Lab Results   Component Value Date    " "FREET4 1.35 09/08/2022      No results found for: \"DDIMERQUANT\"  Magnesium   Date Value Ref Range Status   07/27/2023 2.1 1.6 - 2.4 mg/dL Final      No results found for: \"DIGOXIN\"   No results found for: \"TROPONINT\"        Lipid Panel          1/20/2023    09:27 6/8/2023    10:42   Lipid Panel   Total Cholesterol 142  126    Triglycerides 66  72    HDL Cholesterol 63  43    VLDL Cholesterol 13  15    LDL Cholesterol  66  68    LDL/HDL Ratio 1.04  1.60       Labs from [facility] 12/4/2023  Magnesium  1.5 - 2.5 mg/dL 2.1     GLUCOSE  65 - 99 mg/dL 77 Fasting reference interval   UREA NITROGEN (BUN)  7.0 - 25.0 mg/dL 31.0 High     CREATININE  0.70 - 1.18 mg/dL 2.02 High     EGFR NON-AFR. AMERICAN  >=60 mL/min/1.73m2 32 Low     EGFR AFRICAN AMERICAN  >=60 mL/min/1.73m2 39 Low     BUN/CREATININE RATIO  6.0 - 22.0 15.3    SODIUM  135 - 146 mmol/L 131 Low     POTASSIUM  3.5 - 5.3 mmol/L 4.5    CHLORIDE  98 - 110 mmol/L 104    CARBON DIOXIDE  19.0 - 30.0 mmol/L 22.0    ANION GAP  2.0 - 11.0 5.0    CALCIUM  8.6 - 10.3 mg/dL 8.9    Resulting Agency [facility]        Results for orders placed in visit on 02/20/23    Adult Transthoracic Echo Complete w/ Color, Spectral and Contrast if necessary per protocol    Interpretation Summary    Overall LV ejection fraction is 50% with moderate hypokinesis of the basal to mid inferolateral wall, consistent with old myocardial infarction.    Left ventricular diastolic function is consistent with (grade I) impaired relaxation.    There is mild mitral regurgitation, which is eccentrically directed.    There is mild tricuspid regurgitation.  Estimated right ventricular systolic pressure from tricuspid regurgitation is normal (<35 mmHg).    Results for orders placed during the hospital encounter of 11/02/21    Stress Test With Myocardial Perfusion One Day    Interpretation Summary  · Left ventricular ejection fraction is mildly reduced. (Calculated EF = 41%).  · Findings consistent with a normal ECG " stress test.  · Myocardial perfusion imaging indicates a medium-sized infarct located in the basal inferior lateral wall with mild amara-infarct ischemia.  · Impressions are consistent with an intermediate risk study.  · The current study reveals no changes compared to prior study of 2018           Assessment and Plan   Diagnoses and all orders for this visit:    1. Coronary artery disease involving coronary bypass graft of native heart without angina pectoris (Primary)  Assessment & Plan:  Stable without anginal symptoms.  Echocardiogram shows LVEF of 50%.  He did have to interrupt Brilinta therapy during recent intravesicular chemotherapy, however he has resumed taking this daily, and we will plan to continue this long-term due to recurrent thrombus formation in his vein grafts despite therapy with Plavix in the past.  Continue aspirin, Brilinta, carvedilol, rosuvastatin, and nitrate patch.      2. Essential hypertension  Assessment & Plan:  Blood pressure is well-controlled, continue low-dose carvedilol.      3. Mixed hyperlipidemia  Assessment & Plan:  LDL is below goal at 68, continue rosuvastatin 40 mg nightly.      Other orders  -     ticagrelor (Brilinta) 60 MG tablet tablet; Take 1 tablet by mouth 2 (Two) Times a Day.  Dispense: 180 tablet; Refill: 3                Follow Up   Return in about 6 months (around 6/7/2024) for Next scheduled follow up, with Dr. Bae.    Patient was given instructions and counseling regarding his condition or for health maintenance advice. Please see specific information pulled into the AVS if appropriate.     Signed,  Tracee Thorpe, APRN  12/07/2023     Dictated Utilizing Dragon Dictation: Please note that portions of this note were completed with a voice recognition program.  Part of this note may be an electronic transcription/translation of spoken language to printed text using the Dragon Dictation System.

## 2023-12-07 NOTE — ASSESSMENT & PLAN NOTE
Stable without anginal symptoms.  Echocardiogram shows LVEF of 50%.  He did have to interrupt Brilinta therapy during recent intravesicular chemotherapy, however he has resumed taking this daily, and we will plan to continue this long-term due to recurrent thrombus formation in his vein grafts despite therapy with Plavix in the past.  Continue aspirin, Brilinta, carvedilol, rosuvastatin, and nitrate patch.

## 2023-12-12 ENCOUNTER — TRANSCRIBE ORDERS (OUTPATIENT)
Dept: ADMINISTRATIVE | Facility: HOSPITAL | Age: 78
End: 2023-12-12
Payer: MEDICARE

## 2023-12-12 DIAGNOSIS — C67.9 MALIGNANT NEOPLASM OF URINARY BLADDER, UNSPECIFIED SITE: Primary | ICD-10-CM

## 2024-01-11 ENCOUNTER — TELEPHONE (OUTPATIENT)
Dept: CARDIOLOGY | Facility: CLINIC | Age: 79
End: 2024-01-11
Payer: MEDICARE

## 2024-01-11 NOTE — TELEPHONE ENCOUNTER
The [facility] received a fax that requires your attention. The document has been indexed to the patient’s chart for your review.      Reason for sending: EXTERNAL MEDICAL RECORD NOTIFICATION    Documents Description: SURGERY CLEARANCE    Name of Sender: [facility]    Date Indexed: 1/11/24    Notes (if needed): SEE FAX IN CHART UNDER CARE COORDINATION

## 2024-01-15 NOTE — TELEPHONE ENCOUNTER
Procedure: Cystoscopy w/ TURBT (GENERAL)     Medication Directive: Brilinta and aspirin     PMH: CAD sp CABG x 6, HTN, HLD     Last Seen: 12/07/23     STRESS : 11/02/21     Left ventricular ejection fraction is mildly reduced. (Calculated EF = 41%).  Findings consistent with a normal ECG stress test.  Myocardial perfusion imaging indicates a medium-sized infarct located in the basal inferior lateral wall with mild amara-infarct ischemia.  Impressions are consistent with an intermediate risk study.  The current study reveals no changes compared to prior study of 2018     ECHO: 02/20/23  Overall LV ejection fraction is 50% with moderate hypokinesis of the basal to mid inferolateral wall, consistent with old myocardial infarction.    Left ventricular diastolic function is consistent with (grade I) impaired relaxation.    There is mild mitral regurgitation, which is eccentrically directed.    There is mild tricuspid regurgitation.  Estimated right ventricular systolic pressure from tricuspid regurgitation is normal (<35 mmHg

## 2024-01-16 NOTE — TELEPHONE ENCOUNTER
He may proceed with upcoming surgical procedure at moderate risk for perioperative cardiac events.  He may hold Brilinta for 5 days prior to procedure, and aspirin up to 7 days prior to procedure as needed.

## 2024-02-09 RX ORDER — ROSUVASTATIN CALCIUM 40 MG/1
40 TABLET, COATED ORAL DAILY
Qty: 90 TABLET | Refills: 3 | Status: SHIPPED | OUTPATIENT
Start: 2024-02-09

## 2024-02-09 NOTE — TELEPHONE ENCOUNTER
Caller: VIOLA VIKTOR    Relationship: Emergency Contact    Best call back number: [phone]    Requested Prescriptions:   Requested Prescriptions     Pending Prescriptions Disp Refills    rosuvastatin (CRESTOR) 40 MG tablet 90 tablet 2     Sig: Take 1 tablet by mouth Daily.        [facility]arm[facility] where request should be sent: [id number] [place] [facility] [address] AT [facility] [address] [facility] [phone] [facility] [phone] FX     Last office visit with prescribing clinician: 8/9/2023   Last telemedicine visit with prescribing clinician: Visit date not found   Next office visit with prescribing clinician: Visit date not found     Additional details provided by patient: PATIENT RECEIVED QUANTITY OF 3 EMERGENCY DAY SUPPLY CURRENTLY.     Does the patient have less than a 3 day supply:  [x] Yes  [] No    Would you like a call back once the refill request has been completed: [] Yes [x] No    If the office needs to give you a call back, can they leave a voicemail: [] Yes [x] No    Jair Duran   02/09/24 12:07 EST

## 2024-06-04 RX ORDER — FERROUS SULFATE 325(65) MG
1 TABLET ORAL EVERY OTHER DAY
Qty: 45 TABLET | Refills: 1 | Status: SHIPPED | OUTPATIENT
Start: 2024-06-04

## 2024-06-21 RX ORDER — NITROGLYCERIN 40 MG/1
PATCH TRANSDERMAL
Qty: 90 PATCH | Refills: 0 | Status: SHIPPED | OUTPATIENT
Start: 2024-06-21

## 2024-06-27 DIAGNOSIS — I25.810 CORONARY ARTERY DISEASE INVOLVING CORONARY BYPASS GRAFT OF NATIVE HEART WITHOUT ANGINA PECTORIS: Chronic | ICD-10-CM

## 2024-06-27 RX ORDER — ASPIRIN 81 MG/1
81 TABLET, COATED ORAL DAILY
Qty: 90 TABLET | Refills: 3 | Status: SHIPPED | OUTPATIENT
Start: 2024-06-27

## 2024-07-15 ENCOUNTER — OFFICE VISIT (OUTPATIENT)
Dept: CARDIOLOGY | Facility: CLINIC | Age: 79
End: 2024-07-15
Payer: MEDICARE

## 2024-07-15 VITALS
SYSTOLIC BLOOD PRESSURE: 125 MMHG | DIASTOLIC BLOOD PRESSURE: 80 MMHG | HEIGHT: 70 IN | WEIGHT: 195 LBS | BODY MASS INDEX: 27.92 KG/M2 | HEART RATE: 56 BPM

## 2024-07-15 DIAGNOSIS — E78.2 MIXED HYPERLIPIDEMIA: Chronic | ICD-10-CM

## 2024-07-15 DIAGNOSIS — I10 ESSENTIAL HYPERTENSION: Chronic | ICD-10-CM

## 2024-07-15 DIAGNOSIS — I25.810 CORONARY ARTERY DISEASE INVOLVING CORONARY BYPASS GRAFT OF NATIVE HEART WITHOUT ANGINA PECTORIS: Primary | Chronic | ICD-10-CM

## 2024-07-15 PROCEDURE — 99214 OFFICE O/P EST MOD 30 MIN: CPT | Performed by: INTERNAL MEDICINE

## 2024-07-15 PROCEDURE — 3074F SYST BP LT 130 MM HG: CPT | Performed by: INTERNAL MEDICINE

## 2024-07-15 PROCEDURE — 1160F RVW MEDS BY RX/DR IN RCRD: CPT | Performed by: INTERNAL MEDICINE

## 2024-07-15 PROCEDURE — 3079F DIAST BP 80-89 MM HG: CPT | Performed by: INTERNAL MEDICINE

## 2024-07-15 PROCEDURE — 1159F MED LIST DOCD IN RCRD: CPT | Performed by: INTERNAL MEDICINE

## 2024-07-15 RX ORDER — NITROGLYCERIN 40 MG/1
PATCH TRANSDERMAL
Qty: 90 PATCH | Refills: 3 | Status: SHIPPED | OUTPATIENT
Start: 2024-07-15

## 2024-07-15 NOTE — ASSESSMENT & PLAN NOTE
No recent lipid panel available.  Will continue rosuvastatin 40 mg daily.  Will repeat lipid panel today and adjust the dose if indicated.

## 2024-07-15 NOTE — ASSESSMENT & PLAN NOTE
He is currently stable with no angina.  LVEF 50% per most recent echocardiogram.  Will continue aspirin, Brilinta, statin beta-blocker nitroglycerin patch without changes.

## 2024-07-15 NOTE — PROGRESS NOTES
CARDIOLOGY FOLLOW-UP PROGRESS NOTE        Chief Complaint  Follow-up, Coronary Artery Disease, and Hyperlipidemia    Subjective            Derek Castelan presents to [facility] CARDIOLOGY  History of Present Illness    Mr Castelan is here for routine 6-month follow-up visit for coronary arteries and hyperlipidemia.  He reports no new complaints.  He is finally started gaining weight and is back to his baseline weight at this time.  No hematuria.  Most recent CT scan showed no recurrence of cancer.  Very energetic at this time.  No recent episodes of chest pain, palpitations, shortness of breath or dizziness.  He is taking all other medications including Brilinta as prescribed.    Past History:    Coronary artery disease s/p coronary artery bypass grafting in July 2007 ( LIMA to diagonal 1 and the LAD, SVG to OM1 and OM2, and SVG to sequential graft to the RCA and PDA).  Non-STEMI in 2016.  Cardiac cath showed patent LIMA to LAD and SVG to RCA.  SVG to OM1 and OM 2 was filled with thrombus after the initial attachment.  Native left circumflex was artery had a 60% stenosis.  Native RCA is 100% occluded.  Medical management was opted with Brilinta.     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    Medical History:  Past Medical History:   Diagnosis Date    Bladder mass 04/08/2020    Calculus of kidney     Cancer     Bladder Cancer    CHF (congestive heart failure) 2007    Coronary artery disease involving coronary bypass graft of native heart without angina pectoris 07/22/2021    with previous bypass surgery (July 2007 x6, LIMA to diagonal 1 and the LAD, SVG to OM1 and OM2, and SVG to sequential graft to the RCA and PDA); Previous myocardial infarction (even when on clopidogrel    Essential hypertension 07/22/2021    Hematuria 04/08/2020     Hypothyroidism     Mixed hyperlipidemia 08/01/2021    LDL was 70 in July 2021.  Patient is maintained on high-dose Crestor 40 mg, he is tolerating this, benefiting from it, and should continue same.    Myocardial infarction 2007       Surgical History: has a past surgical history that includes Hernia repair (2001); Cardiac surgery (2007); Skin cancer excision; and Bladder tumor excision.     Family History: family history includes Cancer in his mother and sister; Diabetes in his mother; Heart disease in his brother, brother, and mother; Hypertension in his mother; Vision loss in his mother.     Social History: reports that he has never smoked. He has never used smokeless tobacco. He reports that he does not drink alcohol and does not use drugs.    Allergies: Patient has no known allergies.    Current Outpatient Medications on File Prior to Visit   Medication Sig    Aspirin Low Dose 81 MG EC tablet TAKE 1 TABLET BY MOUTH DAILY    carvedilol (COREG) 3.125 MG tablet Take 1 tablet by mouth Every Evening.    docusate sodium (COLACE) 100 MG capsule Take 1 capsule by mouth 2 (Two) Times a Day.    FeroSul 325 (65 Fe) MG tablet TAKE 1 TABLET BY MOUTH EVERY OTHER DAY    levothyroxine (SYNTHROID, LEVOTHROID) 88 MCG tablet TAKE ONE TABLET BY MOUTH DAILY    nitroglycerin (NITROSTAT) 0.4 MG SL tablet Place 1 tablet under the tongue Every 5 (Five) Minutes As Needed for Chest Pain. Take no more than 3 doses in 15 minutes.    ondansetron (ZOFRAN) 4 MG tablet Take 1 tablet by mouth Every 8 (Eight) Hours As Needed for Nausea.    rosuvastatin (CRESTOR) 40 MG tablet Take 1 tablet by mouth Daily.    ticagrelor (Brilinta) 60 MG tablet tablet Take 1 tablet by mouth 2 (Two) Times a Day.    nitroglycerin (NITRODUR) 0.2 MG/HR patch APPLY ONE PATCH TO THE SKIN EVERY MORNING AND REMOVE IN THE EVENING.         Review of Systems   Respiratory:  Negative for cough, shortness of breath and wheezing.    Cardiovascular:  Negative for chest pain,  "palpitations and leg swelling.   Gastrointestinal:  Negative for nausea and vomiting.   Neurological:  Negative for dizziness and syncope.        Objective     /80   Pulse 56   Ht 177.8 cm (70\")   Wt 88.5 kg (195 lb)   BMI 27.98 kg/m²       Physical Exam    General : Alert, awake, no acute distress  Neck : Supple, no carotid bruit, no jugular venous distention  CVS : Regular rate and rhythm, no murmur, rubs or gallops  Lungs: Clear to auscultation bilaterally, no crackles or rhonchi  Abdomen: Soft, nontender, bowel sounds heard in all 4 quadrants  Extremities: Warm, well-perfused, no pedal edema    Result Review :     The following data was reviewed by: Derek Bae MD on 07/15/2024:    CMP          7/27/2023    11:07 9/8/2023    11:31 12/4/2023    11:24   CMP   Glucose 92  83  NEGATIVE       BUN 24  28     Creatinine 1.90  1.97     EGFR 36.1  34.6     Sodium 140  136     Potassium 4.7  5.0     Chloride 106  105     Calcium 9.3  9.1     Albumin  3.7     BUN/Creatinine Ratio 12.6  14.2     Anion Gap 10.1  8.0        Details          This result is from an external source.             CBC          7/27/2023    11:07 12/4/2023    11:24   CBC   WBC 5.83  10-20       RBC 3.22     Hemoglobin 10.2     Hematocrit 31.3     MCV 97.2     MCH 31.7     MCHC 32.6     RDW 15.0     Platelets 209        Details          This result is from an external source.             TSH          7/27/2023    11:07   TSH   TSH 2.750             Data reviewed: Cardiology studies        Results for orders placed in visit on 02/20/23    Adult Transthoracic Echo Complete w/ Color, Spectral and Contrast if necessary per protocol    Interpretation Summary    Overall LV ejection fraction is 50% with moderate hypokinesis of the basal to mid inferolateral wall, consistent with old myocardial infarction.    Left ventricular diastolic function is consistent with (grade I) impaired relaxation.    There is mild mitral regurgitation, which is " eccentrically directed.    There is mild tricuspid regurgitation.  Estimated right ventricular systolic pressure from tricuspid regurgitation is normal (<35 mmHg).                 Assessment and Plan        Diagnoses and all orders for this visit:    1. Coronary artery disease involving coronary bypass graft of native heart without angina pectoris (Primary)  Assessment & Plan:  He is currently stable with no angina.  LVEF 50% per most recent echocardiogram.  Will continue aspirin, Brilinta, statin beta-blocker nitroglycerin patch without changes.    Orders:  -     nitroglycerin (NITRODUR) 0.2 MG/HR patch; APPLY ONE PATCH TO THE SKIN EVERY MORNING AND REMOVE IN THE EVENING  Dispense: 90 patch; Refill: 3  -     CBC (No Diff); Future    2. Mixed hyperlipidemia  Assessment & Plan:  No recent lipid panel available.  Will continue rosuvastatin 40 mg daily.  Will repeat lipid panel today and adjust the dose if indicated.    Orders:  -     Lipid Panel; Future  -     Comprehensive Metabolic Panel; Future    3. Essential hypertension  Assessment & Plan:  Blood pressure is well-controlled.  Continue current regimen.                Follow Up     Return in about 6 months (around 1/15/2025) for Next scheduled follow up, with Tracee ESCAMILLA.    Patient was given instructions and counseling regarding his condition or for health maintenance advice. Please see specific information pulled into the AVS if appropriate.

## 2024-07-17 DIAGNOSIS — I10 ESSENTIAL HYPERTENSION: Chronic | ICD-10-CM

## 2024-07-17 DIAGNOSIS — I25.810 CORONARY ARTERY DISEASE INVOLVING CORONARY BYPASS GRAFT OF NATIVE HEART WITHOUT ANGINA PECTORIS: Chronic | ICD-10-CM

## 2024-07-17 RX ORDER — CARVEDILOL 3.12 MG/1
3.12 TABLET ORAL EVERY EVENING
Qty: 90 TABLET | Refills: 3 | Status: SHIPPED | OUTPATIENT
Start: 2024-07-17

## 2024-08-21 ENCOUNTER — LAB (OUTPATIENT)
Dept: LAB | Facility: HOSPITAL | Age: 79
End: 2024-08-21
Payer: MEDICARE

## 2024-08-21 DIAGNOSIS — I25.810 CORONARY ARTERY DISEASE INVOLVING CORONARY BYPASS GRAFT OF NATIVE HEART WITHOUT ANGINA PECTORIS: Chronic | ICD-10-CM

## 2024-08-21 DIAGNOSIS — E78.2 MIXED HYPERLIPIDEMIA: Chronic | ICD-10-CM

## 2024-08-21 LAB
ALBUMIN SERPL-MCNC: 4.3 G/DL (ref 3.5–5.2)
ALBUMIN/GLOB SERPL: 1.7 G/DL
ALP SERPL-CCNC: 96 U/L (ref 39–117)
ALT SERPL W P-5'-P-CCNC: 25 U/L (ref 1–41)
ANION GAP SERPL CALCULATED.3IONS-SCNC: 10 MMOL/L (ref 5–15)
AST SERPL-CCNC: 36 U/L (ref 1–40)
BILIRUB SERPL-MCNC: 0.3 MG/DL (ref 0–1.2)
BUN SERPL-MCNC: 29 MG/DL (ref 8–23)
BUN/CREAT SERPL: 16.3 (ref 7–25)
CALCIUM SPEC-SCNC: 9.4 MG/DL (ref 8.6–10.5)
CHLORIDE SERPL-SCNC: 106 MMOL/L (ref 98–107)
CHOLEST SERPL-MCNC: 134 MG/DL (ref 0–200)
CO2 SERPL-SCNC: 22 MMOL/L (ref 22–29)
CREAT SERPL-MCNC: 1.78 MG/DL (ref 0.76–1.27)
DEPRECATED RDW RBC AUTO: 43.4 FL (ref 37–54)
EGFRCR SERPLBLD CKD-EPI 2021: 38.8 ML/MIN/1.73
ERYTHROCYTE [DISTWIDTH] IN BLOOD BY AUTOMATED COUNT: 13 % (ref 12.3–15.4)
GLOBULIN UR ELPH-MCNC: 2.5 GM/DL
GLUCOSE SERPL-MCNC: 94 MG/DL (ref 65–99)
HCT VFR BLD AUTO: 37.7 % (ref 37.5–51)
HDLC SERPL-MCNC: 52 MG/DL (ref 40–60)
HGB BLD-MCNC: 12.6 G/DL (ref 13–17.7)
LDLC SERPL CALC-MCNC: 68 MG/DL (ref 0–100)
LDLC/HDLC SERPL: 1.31 {RATIO}
MCH RBC QN AUTO: 31.2 PG (ref 26.6–33)
MCHC RBC AUTO-ENTMCNC: 33.4 G/DL (ref 31.5–35.7)
MCV RBC AUTO: 93.3 FL (ref 79–97)
PLATELET # BLD AUTO: 143 10*3/MM3 (ref 140–450)
PMV BLD AUTO: 10.5 FL (ref 6–12)
POTASSIUM SERPL-SCNC: 4.7 MMOL/L (ref 3.5–5.2)
PROT SERPL-MCNC: 6.8 G/DL (ref 6–8.5)
RBC # BLD AUTO: 4.04 10*6/MM3 (ref 4.14–5.8)
SODIUM SERPL-SCNC: 138 MMOL/L (ref 136–145)
TRIGL SERPL-MCNC: 69 MG/DL (ref 0–150)
VLDLC SERPL-MCNC: 14 MG/DL (ref 5–40)
WBC NRBC COR # BLD AUTO: 4.41 10*3/MM3 (ref 3.4–10.8)

## 2024-08-21 PROCEDURE — 80053 COMPREHEN METABOLIC PANEL: CPT

## 2024-08-21 PROCEDURE — 36415 COLL VENOUS BLD VENIPUNCTURE: CPT

## 2024-08-21 PROCEDURE — 85027 COMPLETE CBC AUTOMATED: CPT

## 2024-08-21 PROCEDURE — 80061 LIPID PANEL: CPT

## 2024-08-23 ENCOUNTER — TELEPHONE (OUTPATIENT)
Dept: CARDIOLOGY | Facility: CLINIC | Age: 79
End: 2024-08-23
Payer: MEDICARE

## 2024-08-23 NOTE — TELEPHONE ENCOUNTER
----- Message from Tracee Thorpe sent at 8/21/2024  8:03 PM EDT -----  Recent labs are reassuring.  There is been an improvement in his anemia, cholesterol is well-controlled, renal function is stable and electrolytes were within normal.  From a cardiac standpoint he may continue his routine medications, and keep previously scheduled follow-up appointment.

## 2024-09-16 ENCOUNTER — OFFICE VISIT (OUTPATIENT)
Dept: INTERNAL MEDICINE | Age: 79
End: 2024-09-16
Payer: MEDICARE

## 2024-09-16 ENCOUNTER — LAB (OUTPATIENT)
Dept: LAB | Facility: HOSPITAL | Age: 79
End: 2024-09-16
Payer: MEDICARE

## 2024-09-16 VITALS
SYSTOLIC BLOOD PRESSURE: 124 MMHG | WEIGHT: 195.8 LBS | HEART RATE: 51 BPM | DIASTOLIC BLOOD PRESSURE: 80 MMHG | BODY MASS INDEX: 28.09 KG/M2 | TEMPERATURE: 97.3 F | OXYGEN SATURATION: 98 %

## 2024-09-16 DIAGNOSIS — Z00.00 MEDICARE ANNUAL WELLNESS VISIT, SUBSEQUENT: ICD-10-CM

## 2024-09-16 DIAGNOSIS — N18.32 STAGE 3B CHRONIC KIDNEY DISEASE: ICD-10-CM

## 2024-09-16 DIAGNOSIS — E03.4 ATROPHY OF THYROID: ICD-10-CM

## 2024-09-16 DIAGNOSIS — I10 ESSENTIAL HYPERTENSION: Chronic | ICD-10-CM

## 2024-09-16 DIAGNOSIS — D50.0 IRON DEFICIENCY ANEMIA DUE TO CHRONIC BLOOD LOSS: ICD-10-CM

## 2024-09-16 DIAGNOSIS — Z12.5 PROSTATE CANCER SCREENING: ICD-10-CM

## 2024-09-16 DIAGNOSIS — E78.2 MIXED HYPERLIPIDEMIA: Chronic | ICD-10-CM

## 2024-09-16 DIAGNOSIS — E55.9 VITAMIN D DEFICIENCY: ICD-10-CM

## 2024-09-16 DIAGNOSIS — Z00.00 MEDICARE ANNUAL WELLNESS VISIT, SUBSEQUENT: Primary | ICD-10-CM

## 2024-09-16 PROBLEM — C66.2: Status: ACTIVE | Noted: 2023-06-07

## 2024-09-16 PROBLEM — Z09 HOSPITAL DISCHARGE FOLLOW-UP: Status: RESOLVED | Noted: 2023-08-09 | Resolved: 2024-09-16

## 2024-09-16 PROBLEM — C67.9 BLADDER CANCER: Status: ACTIVE | Noted: 2023-05-24

## 2024-09-16 LAB
ALBUMIN SERPL-MCNC: 4.3 G/DL (ref 3.5–5.2)
ANION GAP SERPL CALCULATED.3IONS-SCNC: 10 MMOL/L (ref 5–15)
BUN SERPL-MCNC: 32 MG/DL (ref 8–23)
BUN/CREAT SERPL: 18.8 (ref 7–25)
CALCIUM SPEC-SCNC: 9.5 MG/DL (ref 8.6–10.5)
CHLORIDE SERPL-SCNC: 106 MMOL/L (ref 98–107)
CO2 SERPL-SCNC: 22 MMOL/L (ref 22–29)
CREAT SERPL-MCNC: 1.7 MG/DL (ref 0.76–1.27)
EGFRCR SERPLBLD CKD-EPI 2021: 41 ML/MIN/1.73
FERRITIN SERPL-MCNC: 85.4 NG/ML (ref 30–400)
GLUCOSE SERPL-MCNC: 114 MG/DL (ref 65–99)
HCV AB SER QL: NORMAL
IRON 24H UR-MRATE: 98 MCG/DL (ref 59–158)
IRON SATN MFR SERPL: 27 % (ref 20–50)
PHOSPHATE SERPL-MCNC: 3.5 MG/DL (ref 2.5–4.5)
POTASSIUM SERPL-SCNC: 4.6 MMOL/L (ref 3.5–5.2)
SODIUM SERPL-SCNC: 138 MMOL/L (ref 136–145)
TIBC SERPL-MCNC: 361 MCG/DL (ref 298–536)
TRANSFERRIN SERPL-MCNC: 242 MG/DL (ref 200–360)
TSH SERPL DL<=0.05 MIU/L-ACNC: 2.07 UIU/ML (ref 0.27–4.2)

## 2024-09-16 PROCEDURE — 83540 ASSAY OF IRON: CPT

## 2024-09-16 PROCEDURE — 84466 ASSAY OF TRANSFERRIN: CPT

## 2024-09-16 PROCEDURE — 1159F MED LIST DOCD IN RCRD: CPT | Performed by: INTERNAL MEDICINE

## 2024-09-16 PROCEDURE — 36415 COLL VENOUS BLD VENIPUNCTURE: CPT

## 2024-09-16 PROCEDURE — 84443 ASSAY THYROID STIM HORMONE: CPT

## 2024-09-16 PROCEDURE — 96160 PT-FOCUSED HLTH RISK ASSMT: CPT | Performed by: INTERNAL MEDICINE

## 2024-09-16 PROCEDURE — 80069 RENAL FUNCTION PANEL: CPT

## 2024-09-16 PROCEDURE — 1170F FXNL STATUS ASSESSED: CPT | Performed by: INTERNAL MEDICINE

## 2024-09-16 PROCEDURE — 3079F DIAST BP 80-89 MM HG: CPT | Performed by: INTERNAL MEDICINE

## 2024-09-16 PROCEDURE — 86803 HEPATITIS C AB TEST: CPT

## 2024-09-16 PROCEDURE — 99214 OFFICE O/P EST MOD 30 MIN: CPT | Performed by: INTERNAL MEDICINE

## 2024-09-16 PROCEDURE — G0439 PPPS, SUBSEQ VISIT: HCPCS | Performed by: INTERNAL MEDICINE

## 2024-09-16 PROCEDURE — 1160F RVW MEDS BY RX/DR IN RCRD: CPT | Performed by: INTERNAL MEDICINE

## 2024-09-16 PROCEDURE — 3074F SYST BP LT 130 MM HG: CPT | Performed by: INTERNAL MEDICINE

## 2024-09-16 PROCEDURE — 82728 ASSAY OF FERRITIN: CPT

## 2024-10-14 ENCOUNTER — HOSPITAL ENCOUNTER (OUTPATIENT)
Dept: CT IMAGING | Facility: HOSPITAL | Age: 79
Discharge: HOME | End: 2024-10-14
Admitting: UROLOGY
Payer: MEDICARE

## 2024-10-14 DIAGNOSIS — C67.9 MALIGNANT NEOPLASM OF URINARY BLADDER, UNSPECIFIED SITE: ICD-10-CM

## 2024-10-14 PROCEDURE — 74176 CT ABD & PELVIS W/O CONTRAST: CPT

## 2024-10-27 DIAGNOSIS — E03.4 ATROPHY OF THYROID: ICD-10-CM

## 2024-10-27 DIAGNOSIS — E78.2 MIXED HYPERLIPIDEMIA: Primary | Chronic | ICD-10-CM

## 2024-10-28 RX ORDER — LEVOTHYROXINE SODIUM 88 UG/1
88 TABLET ORAL DAILY
Qty: 90 TABLET | Refills: 3 | Status: SHIPPED | OUTPATIENT
Start: 2024-10-28

## 2024-12-10 ENCOUNTER — TELEPHONE (OUTPATIENT)
Dept: CARDIOLOGY | Facility: CLINIC | Age: 79
End: 2024-12-10
Payer: MEDICARE

## 2024-12-10 NOTE — TELEPHONE ENCOUNTER
The [facility] received a fax that requires your attention. The document has been indexed to the patient’s chart for your review.      Reason for sending: EXTERNAL MEDICAL RECORD NOTIFICATION     Documents Description: BRILINTA 90 DAY [id number]    Name of Sender: [facility]    Date Indexed: 12.10.24

## 2025-02-04 RX ORDER — ROSUVASTATIN CALCIUM 40 MG/1
40 TABLET, COATED ORAL DAILY
Qty: 90 TABLET | Refills: 3 | Status: SHIPPED | OUTPATIENT
Start: 2025-02-04

## 2025-02-04 NOTE — TELEPHONE ENCOUNTER
Rx Refill Note  Requested Prescriptions     Pending Prescriptions Disp Refills    rosuvastatin (CRESTOR) 40 MG tablet 90 tablet 3     Sig: Take 1 tablet by mouth Daily.        LAST OFFICE VISIT:  715/2024     NEXT OFFICE VISIT:  no f/u scheduled     Does the medication requests match the last office note:    [x] Yes   [] No    Does this refill request meet protocol details for MA to approve:     [x] Yes   [] No

## 2025-06-20 ENCOUNTER — TELEPHONE (OUTPATIENT)
Dept: CARDIOLOGY | Facility: CLINIC | Age: 80
End: 2025-06-20
Payer: MEDICARE

## 2025-06-20 NOTE — TELEPHONE ENCOUNTER
Caller: VIKTOR ROD    Relationship: Emergency Contact    Best call back number: [phone]    What is the best time to reach you: ANY    Who are you requesting to speak with (clinical staff, provider,  specific staff member): ANY    What was the call regarding: DOES PATIENT NEED LAB WORK DONE BEFORE HIS APPT ON 7-10-25? PLEASE REACH OUT TO LET THEM KNOW.

## 2025-07-10 ENCOUNTER — OFFICE VISIT (OUTPATIENT)
Dept: CARDIOLOGY | Facility: CLINIC | Age: 80
End: 2025-07-10
Payer: MEDICARE

## 2025-07-10 VITALS
SYSTOLIC BLOOD PRESSURE: 136 MMHG | DIASTOLIC BLOOD PRESSURE: 89 MMHG | HEIGHT: 70 IN | BODY MASS INDEX: 28.06 KG/M2 | HEART RATE: 55 BPM | WEIGHT: 196 LBS

## 2025-07-10 DIAGNOSIS — I10 ESSENTIAL HYPERTENSION: ICD-10-CM

## 2025-07-10 DIAGNOSIS — E78.2 MIXED HYPERLIPIDEMIA: ICD-10-CM

## 2025-07-10 DIAGNOSIS — I25.810 CORONARY ARTERY DISEASE INVOLVING CORONARY BYPASS GRAFT OF NATIVE HEART WITHOUT ANGINA PECTORIS: Primary | ICD-10-CM

## 2025-07-10 PROCEDURE — 3079F DIAST BP 80-89 MM HG: CPT | Performed by: FAMILY MEDICINE

## 2025-07-10 PROCEDURE — 99214 OFFICE O/P EST MOD 30 MIN: CPT | Performed by: FAMILY MEDICINE

## 2025-07-10 PROCEDURE — 3075F SYST BP GE 130 - 139MM HG: CPT | Performed by: FAMILY MEDICINE

## 2025-07-10 NOTE — PROGRESS NOTES
Chief Complaint  6 month follow up  and Coronary Artery Disease    Subjective        History of Present Illness  Derek Castelan presents to [facility] CARDIOLOGY   This is a 80-year-old male coming in today for routine cardiac follow-up for coronary artery disease, hypertension and hyperlipidemia.  He is doing well, denies any concerns at visit today.  Specifically has no complaints of chest pains, palpitations or shortness of breath.  Tolerating medication regiment without any issue.  He remains active.    Past History:     Coronary artery disease s/p coronary artery bypass grafting in July 2007 ( LIMA to diagonal 1 and the LAD, SVG to OM1 and OM2, and SVG to sequential graft to the RCA and PDA).  Non-STEMI in 2016.  Cardiac cath showed patent LIMA to LAD and SVG to RCA.  SVG to OM1 and OM 2 was filled with thrombus after the initial attachment.  Native left circumflex was artery had a 60% stenosis.  Native RCA is 100% occluded.  Medical management was opted with Brilinta.     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      Past Medical History:   Diagnosis Date    Bladder mass 04/08/2020    Calculus of kidney     Cancer     CHF (congestive heart failure) 2007    Coronary artery disease involving coronary bypass graft of native heart without angina pectoris 07/22/2021    Essential hypertension 07/22/2021    Hematuria 04/08/2020    Hypothyroidism     Mixed hyperlipidemia 08/01/2021    Myocardial infarction 2007       No Known Allergies     Past Surgical History:   Procedure Laterality Date    BLADDER TUMOR EXCISION      CARDIAC SURGERY  2007    open heart    HERNIA REPAIR  2001    SKIN CANCER EXCISION      bascal cell        Social History  He  reports that he has never smoked. He has never used smokeless tobacco. He reports that he  "does not drink alcohol and does not use drugs.    Family History  His family history includes Cancer in his mother and sister; Diabetes in his mother; Heart disease in his brother, brother, and mother; Hypertension in his mother; Vision loss in his mother.       Current Outpatient Medications on File Prior to Visit   Medication Sig    Aspirin Low Dose 81 MG EC tablet TAKE 1 TABLET BY MOUTH DAILY    carvedilol (COREG) 3.125 MG tablet TAKE ONE TABLET BY MOUTH EVERY EVENING    docusate sodium (COLACE) 100 MG capsule Take 1 capsule by mouth 2 (Two) Times a Day.    FeroSul 325 (65 Fe) MG tablet TAKE 1 TABLET BY MOUTH EVERY OTHER DAY    levothyroxine (SYNTHROID, LEVOTHROID) 88 MCG tablet TAKE 1 TABLET BY MOUTH DAILY    nitroglycerin (NITRODUR) 0.2 MG/HR patch APPLY ONE PATCH TO THE SKIN EVERY MORNING AND REMOVE IN THE EVENING    nitroglycerin (NITROSTAT) 0.4 MG SL tablet Place 1 tablet under the tongue Every 5 (Five) Minutes As Needed for Chest Pain. Take no more than 3 doses in 15 minutes.    ondansetron (ZOFRAN) 4 MG tablet Take 1 tablet by mouth Every 8 (Eight) Hours As Needed for Nausea.    rosuvastatin (CRESTOR) 40 MG tablet Take 1 tablet by mouth Daily.    ticagrelor (Brilinta) 60 MG tablet tablet Take 1 tablet by mouth 2 (Two) Times a Day.     No current facility-administered medications on file prior to visit.         Review of Systems   Constitutional:  Negative for fatigue.   Respiratory:  Negative for cough, chest tightness and shortness of breath.    Cardiovascular:  Negative for chest pain, palpitations and leg swelling.   Gastrointestinal:  Negative for nausea and vomiting.   Neurological:  Negative for dizziness and syncope.        Objective   Vitals:    07/10/25 0942   BP: 136/89   Pulse: 55   Weight: 88.9 kg (196 lb)   Height: 177.8 cm (70\")         Physical Exam  General : Alert, awake, no acute distress  Neck : Supple, no carotid bruit, no jugular venous distention  CVS : Regular rate and rhythm, no " "murmur, no rubs or gallops  Lungs: Clear to auscultation bilaterally, no crackles or rhonchi  Abdomen: Soft, nontender, bowel sounds active  Extremities: Warm, well-perfused, no pedal edema      Result Review     The following data was reviewed by ANDI Cox  proBNP   Date Value Ref Range Status   06/08/2023 681.0 0.0 - 1,800.0 pg/mL Final     CMP          8/21/2024    09:20 9/16/2024    14:51   CMP   Glucose 94  114    BUN 29  32    Creatinine 1.78  1.70    EGFR 38.8  41.0    Sodium 138  138    Potassium 4.7  4.6    Chloride 106  106    Calcium 9.4  9.5    Total Protein 6.8     Albumin 4.3  4.3    Globulin 2.5     Total Bilirubin 0.3     Alkaline Phosphatase 96     AST (SGOT) 36     ALT (SGPT) 25     Albumin/Globulin Ratio 1.7     BUN/Creatinine Ratio 16.3  18.8    Anion Gap 10.0  10.0      CBC w/diff          8/21/2024    09:20   CBC w/Diff   WBC 4.41    RBC 4.04    Hemoglobin 12.6    Hematocrit 37.7    MCV 93.3    MCH 31.2    MCHC 33.4    RDW 13.0    Platelets 143       Lab Results   Component Value Date    TSH 2.070 09/16/2024      Lab Results   Component Value Date    FREET4 1.35 09/08/2022      No results found for: \"DDIMERQUANT\"  Magnesium   Date Value Ref Range Status   07/27/2023 2.1 1.6 - 2.4 mg/dL Final      No results found for: \"DIGOXIN\"   No results found for: \"TROPONINT\"        Lipid Panel          8/21/2024    09:20   Lipid Panel   Total Cholesterol 134    Triglycerides 69    HDL Cholesterol 52    VLDL Cholesterol 14    LDL Cholesterol  68    LDL/HDL Ratio 1.31          Results for orders placed in visit on 02/20/23    Adult Transthoracic Echo Complete w/ Color, Spectral and Contrast if necessary per protocol 02/21/2023  7:49 AM    Interpretation Summary    Overall LV ejection fraction is 50% with moderate hypokinesis of the basal to mid inferolateral wall, consistent with old myocardial infarction.    Left ventricular diastolic function is consistent with (grade I) impaired relaxation.    " There is mild mitral regurgitation, which is eccentrically directed.    There is mild tricuspid regurgitation.  Estimated right ventricular systolic pressure from tricuspid regurgitation is normal (<35 mmHg).    Results for orders placed during the hospital encounter of 11/02/21    Stress Test With Myocardial Perfusion One Day 11/02/2021  4:30 PM    Interpretation Summary  · Left ventricular ejection fraction is mildly reduced. (Calculated EF = 41%).  · Findings consistent with a normal ECG stress test.  · Myocardial perfusion imaging indicates a medium-sized infarct located in the basal inferior lateral wall with mild amara-infarct ischemia.  · Impressions are consistent with an intermediate risk study.  · The current study reveals no changes compared to prior study of 2018           Assessment and Plan   Diagnoses and all orders for this visit:    1. Coronary artery disease involving coronary bypass graft of native heart without angina pectoris (Primary)  Assessment & Plan:  He is currently stable without symptoms of angina.  Continue current regiment with aspirin, Brilinta, nitrate patch, beta-blocker and statin.      2. Essential hypertension  Assessment & Plan:  Blood pressure is well-controlled, continue low-dose carvedilol.      3. Mixed hyperlipidemia  Assessment & Plan:   Well-controlled, most recent LDL 68.  Continue current dose rosuvastatin 40 mg nightly.             Follow Up   Return in about 1 year (around 7/10/2026) for with Dr. Bae.    Patient was given instructions and counseling regarding his condition or for health maintenance advice. Please see specific information pulled into the AVS if appropriate.     Signed,  Tracee Thorpe, ANDI  07/10/2025     Dictated Utilizing Dragon Dictation: Please note that portions of this note were completed with a voice recognition program.  Part of this note may be an electronic transcription/translation of spoken language to printed text using the Dragon  Dictation System.

## 2025-07-25 DIAGNOSIS — I25.810 CORONARY ARTERY DISEASE INVOLVING CORONARY BYPASS GRAFT OF NATIVE HEART WITHOUT ANGINA PECTORIS: Chronic | ICD-10-CM

## 2025-07-25 RX ORDER — ASPIRIN 81 MG/1
81 TABLET, COATED ORAL DAILY
Qty: 90 TABLET | Refills: 3 | Status: SHIPPED | OUTPATIENT
Start: 2025-07-25